# Patient Record
Sex: MALE | Race: WHITE | Employment: FULL TIME | URBAN - METROPOLITAN AREA
[De-identification: names, ages, dates, MRNs, and addresses within clinical notes are randomized per-mention and may not be internally consistent; named-entity substitution may affect disease eponyms.]

---
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
## 2020-01-01 ASSESSMENT — ANXIETY QUESTIONNAIRES: GAD7 TOTAL SCORE: 15

## 2020-01-03 ENCOUNTER — HOSPITAL ENCOUNTER (OUTPATIENT)
Dept: BEHAVIORAL HEALTH | Facility: CLINIC | Age: 20
End: 2020-01-03
Attending: PSYCHIATRY & NEUROLOGY
Payer: COMMERCIAL

## 2020-01-03 PROCEDURE — 90853 GROUP PSYCHOTHERAPY: CPT

## 2020-01-03 PROCEDURE — G0177 OPPS/PHP; TRAIN & EDUC SERV: HCPCS

## 2020-01-03 NOTE — GROUP NOTE
EBP Group Note    PATIENT'S NAME: Fantasma Desouza  MRN:   [id number]  :   2000  ACCT. NUMBER: [id number]  DATE OF SERVICE: 20  START TIME: 11:00 AM  END TIME: 11:50 AM  FACILITATOR: Sagrario Moncada  TOPIC:  EBP Group: Specialty Awareness  Adult Mental Health Day Treatment  TRACK: 1A    NUMBER OF PARTICIPANTS: 5    Summary of Group / Topics Discussed:  Specialty Topics: Life Transitions: The topic of life transitions was presented in order to help patients to better understand the challenges presented by life transitions, and how to best navigate them. Exploring the phases of transition and how one works through them was discussed. Patients were provided with information regarding community resources.     Patient Session Goals / Objectives:    Discussed the timing and nature of major life transitions    Explored how life transitions may impact mental health and functioning    Discussed coping strategies to manage symptoms and help with transitioning    Discussed and planned a successful transition        Patient Participation / Response:  Minimally participated, only when prompted / asked.    Demonstrated understanding of topics discussed through group discussion and participation    Treatment Plan:  Patient has a current master individualized treatment plan.  See Epic treatment plan for more information.    Sagrario Moncada

## 2020-01-03 NOTE — GROUP NOTE
RN Group Note    PATIENT'S NAME: Fantasma Desouza  MRN:   [id number]  :   2000  ACCT. NUMBER: [id number]  DATE OF SERVICE: 20  START TIME:  9:00 AM  END TIME:  9:50 AM  FACILITATOR: Sandra Hammond RN  TOPIC:  RN Group: [facility]  Adult Mental Health Day Treatment  TRACK: 1A    NUMBER OF PARTICIPANTS: 5    Summary of Group / Topics Discussed:  Foundations of Health: Sleep: Case study/sleep hygiene: Patients explored the connection between sleep and mental illness. Patients learned about how adequate sleep can improve health, productivity, wellness, quality of life, and safety.     Patient Session Goals / Objectives:  ? Demonstrated understanding of sleep hygiene practices and benefits of sleep  ? Identified sleep hygiene strategies to utilize     Described the connection between sleep disturbances and mental illness        Patient Participation / Response:  Minimally participated, only when prompted / asked.    Verbalized understanding of foundations of health topic    Treatment Plan:  Patient has an initial individualized treatment plan that was created as part of their diagnostic assessment / admission process.  A master individualized treatment plan is in the process of being developed with the patient and multi-disciplinary care team.    Sandra Hammond RN

## 2020-01-03 NOTE — GROUP NOTE
"Process Group Note    PATIENT'S NAME: Fantasma Desouza  MRN:   [id number]  :   2000  ACCT. NUMBER: [id number]  DATE OF SERVICE: 20  START TIME: 10:00 AM  END TIME: 10:50 AM  FACILITATOR: Sagrario Moncada  TOPIC:  Process Group    Diagnoses:  296.32 (F33.1) Major Depressive Disorder, Recurrent Episode, Moderate _300.00 (F41.9) Unspecified Anxiety Disorder       Adult Mental Health Day Treatment  TRACK: 1A    NUMBER OF PARTICIPANTS: 5          Data:    Session content: At the start of this group, patients were invited to check in by identifying themselves, describing their current emotional status, and identifying issues to address in this group.   Area(s) of treatment focus addressed in this session included Symptom Management, Personal Safety, Develop / Improve Independent Living Skills and Develop Socialization / Interpersonal Relationship Skills.  Fantasma reported he is \"okay.\"  He reported he is not sleeping due to nightmares.  He reported high symptoms and suicidal ideation present but no worse than before.  He committed to safety and denied intent to act.  He reported high ruminations and denied doing anything to cope.  GHe reported need to work out but low motivation was a barrier.  He denied other plans for the weekend.      Therapeutic Interventions/Treatment Strategies:  Psychotherapist offered support, feedback and validation and reinforced use of skills. Treatment modalities used include Motivational Interviewing, Cognitive Behavioral Therapy and Dialectical Behavioral Therapy.  Validated and normalized.  Attempted to highlighted and reinforced skills used but Client denied use of skills.  Provided skill suggestions to take steps towards working out.  Aided in creating a plan to help work towards goals.      Assessment:    Patient response:   Patient responded to session by accepting feedback and accepting support    Possible barriers to participation / learning include: and no barriers " identified    Health Issues:   Yes: Sleep disturbance, Associated Psychological Distress       Substance Use Review:   Substance Use: No active concerns identified.    Mental Status/Behavioral Observations  Appearance:   Appropriate   Eye Contact:   Poor  Psychomotor Behavior: Normal   Attitude:   Guarded   Orientation:   All  Speech   Rate / Production: Normal    Volume:  Soft   Mood:    Depressed   Affect:    Appropriate   Thought Content:   Rumination and Safety reports  presence of suicidal ideation passive suicidal ideation   Thought Form:  Coherent  Logical     Insight:    Fair     Plan:     Safety Plan: Committed to safety and agreed to follow previously developed safety coping plan.      Barriers to treatment: None identified    Patient Contracts (see media tab):  None    Substance Use: Not addressed in session     Continue or Discharge: Patient will continue in Adult Day Treatment (ADT)  as planned. Patient is likely to benefit from learning and using skills as they work toward the goals identified in their treatment plan.      Sagrario Moncada  January 3, 2020

## 2020-01-06 ENCOUNTER — HOSPITAL ENCOUNTER (OUTPATIENT)
Dept: BEHAVIORAL HEALTH | Facility: CLINIC | Age: 20
End: 2020-01-06
Attending: PSYCHIATRY & NEUROLOGY
Payer: COMMERCIAL

## 2020-01-06 PROCEDURE — 90853 GROUP PSYCHOTHERAPY: CPT

## 2020-01-06 PROCEDURE — G0177 OPPS/PHP; TRAIN & EDUC SERV: HCPCS | Performed by: OCCUPATIONAL THERAPIST

## 2020-01-06 NOTE — GROUP NOTE
Life Skills/OT Group Note    PATIENT'S NAME: Fantasma Desouza  MRN:   [id number]  :   2000  ACCT. NUMBER: [id number]  DATE OF SERVICE: 20  START TIME: 11:00 AM  END TIME: 11:50 AM  FACILITATOR: Mindi Siegel OTR/L  TOPIC:  Life Skills Group: Cognitive Functioning  Adult Mental Health Day Treatment  TRACK: 1A    NUMBER OF PARTICIPANTS: 4    Summary of Group / Topics Discussed:  Cognitive Functioning: Patients were taught and provided with an opportunity to gain awareness of how their mental health symptoms impact their current cognitive functioning as well as how this impacts their performance and participation in meaningful roles, relationships, and routines.  Patients were taught skills and strategies on how to monitor and improve cognitive performance.  Patients were given opportunities to practice taught skills and techniques in session and how to apply to everyday life.        Patient Session Goals / Objectives:    Identified how their mental health symptoms impact their functioning, focusing on specific cognitive challenges        Established a plan for practice of these skills in their own environments    Practiced and reflected on how to generalize taught skills to their everyday life          Patient Participation / Response:  Minimally participated, only when prompted / asked.    Patient presentation: constricted affect; quiet in group with minimal interaction with peers or this writer and Patient would benefit from additional opportunities to practice the content to be able to generalize it to their everyday life with increased intentionality, consistency, and efficacy in support of their psychiatric recovery    Treatment Plan:  Patient has an initial individualized treatment plan that was created as part of their diagnostic assessment / admission process.  A master individualized treatment plan is in the process of being developed with the patient and multi-disciplinary care team.    Mindi IGNACIO  Robbin, OTR/L

## 2020-01-06 NOTE — GROUP NOTE
Process Group Note    PATIENT'S NAME: Fantasma Desouza  MRN:   [id number]  :   2000  ACCT. NUMBER: [id number]  DATE OF SERVICE: 20  START TIME:  9:00 AM  END TIME:  9:50 AM  FACILITATOR: Sagrario Moncada  TOPIC:  Process Group    Diagnoses:  296.32 (F33.1) Major Depressive Disorder, Recurrent Episode, Moderate _300.00 (F41.9) Unspecified Anxiety Disorder       Adult Mental Health Day Treatment  TRACK: 1A    NUMBER OF PARTICIPANTS: 4          Data:    Session content: At the start of this group, patients were invited to check in by identifying themselves, describing their current emotional status, and identifying issues to address in this group.   Area(s) of treatment focus addressed in this session included Symptom Management, Personal Safety, Develop / Improve Independent Living Skills and Develop Socialization / Interpersonal Relationship Skills.  Fantasma reported having a solid past few days.  He reports feeling exhausted from lack of sleep finally lead to a good night of sleep.  He reported working out once with his friend but is disappointed that his friend initiated this not him.  He reported noticing an improved mood after working out.  He reported ongoing high ruminations.  He denied doing anything to cope but then stated he was trying to use distractions but they were not working.  He reported plans to try to work more this week.      Therapeutic Interventions/Treatment Strategies:  Psychotherapist offered support, feedback and validation and reinforced use of skills. Treatment modalities used include Motivational Interviewing, Cognitive Behavioral Therapy and Dialectical Behavioral Therapy.  Validated and normalized.  Highlighted and reinforced skills used; connected to positive outcomes.  Provided additional skill suggestions to aid in ruminations.  Identified and challenged cognitive distortions.  Problem solved barriers to initiating working out on his own.  Aided in creating a plan to help  work towards goals.      Assessment:    Patient response:   Patient responded to session by accepting feedback, giving feedback, listening, focusing on goals and being attentive    Possible barriers to participation / learning include: and no barriers identified    Health Issues:   Yes: Sleep disturbance, Associated Psychological Distress       Substance Use Review:   Substance Use: No active concerns identified.    Mental Status/Behavioral Observations  Appearance:   Appropriate   Eye Contact:   Fair   Psychomotor Behavior: Normal   Attitude:   Guarded   Orientation:   All  Speech   Rate / Production: Normal    Volume:  Normal   Mood:    Depressed   Affect:    Appropriate   Thought Content:   Safety reports  presence of suicidal ideation passive suicidal ideation  and thoughts of self-harm  Thought Form:  Coherent  Logical     Insight:    Fair     Plan:     Safety Plan: Committed to safety and agreed to follow previously developed safety coping plan.      Barriers to treatment: None identified    Patient Contracts (see media tab):  None    Substance Use: Not addressed in session     Continue or Discharge: Patient will continue in Adult Day Treatment (ADT)  as planned. Patient is likely to benefit from learning and using skills as they work toward the goals identified in their treatment plan.      Sagrario Moncada  January 6, 2020

## 2020-01-06 NOTE — GROUP NOTE
EBP Group Note    PATIENT'S NAME: Fantasma Desouza  MRN:   [id number]  :   2000  ACCT. NUMBER: [id number]  DATE OF SERVICE: 20  START TIME: 10:00 AM  END TIME: 10:50 AM  FACILITATOR: Sagrario Moncada  TOPIC:  EBP Group: Mindfulness  Adult Mental Health Day Treatment  TRACK: 1A    NUMBER OF PARTICIPANTS: 4    Summary of Group / Topics Discussed:  Mindfulness: What is Mindfulness: Patients received an overview on what mindfulness is and how mindfulness can benefit general health, mental health symptoms, and stressors. The history of mindfulness, its application to mental health therapies, and key concepts were also discussed. Patients discussed current awareness, knowledge, and practice of mindfulness skills. Patients also discussed barriers to mindfulness practice.     Patient Session Goals / Objectives:    Demonstrated understanding of key concepts and application to daily life    Identified when/how to use mindfulness     Resolved barriers to practice    Identified plan to use mindfulness in daily life      Patient Participation / Response:  Minimally participated, only when prompted / asked.    Demonstrated understanding of topics discussed through group discussion and participation    Treatment Plan:  Patient has an initial individualized treatment plan that was created as part of their diagnostic assessment / admission process.  A master individualized treatment plan is in the process of being developed with the patient and multi-disciplinary care team.    Sagrario Moncada

## 2020-01-08 ENCOUNTER — HOSPITAL ENCOUNTER (OUTPATIENT)
Dept: BEHAVIORAL HEALTH | Facility: CLINIC | Age: 20
End: 2020-01-08
Attending: PSYCHIATRY & NEUROLOGY
Payer: COMMERCIAL

## 2020-01-08 PROCEDURE — G0177 OPPS/PHP; TRAIN & EDUC SERV: HCPCS

## 2020-01-08 PROCEDURE — 90853 GROUP PSYCHOTHERAPY: CPT

## 2020-01-08 NOTE — GROUP NOTE
Life Skills/OT Group Note    PATIENT'S NAME: Fantasma Desouza  MRN:   [id number]  :   2000  ACCT. NUMBER: [id number]  DATE OF SERVICE: 20  START TIME: 11:00 AM  END TIME: 11:50 AM  FACILITATOR: Fabio Albright OT  TOPIC:  Life Skills Group: Sensory Approaches in Mental Health  Adult Mental Health Day Treatment  TRACK: 1A & 6A    NUMBER OF PARTICIPANTS: 6    Summary of Group / Topics Discussed:  Sensory Approaches in Mental Health:  Coping Through the Senses Introduction: Patients were introduced and taught about neurosensory based skills and strategies related to supporting effective self regulation skills.  Patients were taught about the eight sensory systems and how they can be used for coping with mental health symptoms and stressors.  Patients were provided with an experiential opportunity to increase self-awareness of helpful sensory input and self care activities. Patients were introduced on how to create supportive environments that encourage use of these skills.         Patient Session Goals / Objectives:    Identified specific and individualized neurosensory skills to help when distressed      Identified skills learned and how this applies to current daily life    Established a plan for practice of these skills in their own environments        Patient Participation / Response:  Minimally participated, only when prompted / asked.    Patient would benefit from additional opportunities to practice the content to be able to generalize it to their everyday life with increased intentionality, consistency, and efficacy in support of their psychiatric recovery    Treatment Plan:  Patient has an initial individualized treatment plan that was created as part of their diagnostic assessment / admission process.  A master individualized treatment plan is in the process of being developed with the patient and multi-disciplinary care team.    Fabio Albright OT

## 2020-01-08 NOTE — GROUP NOTE
"Process Group Note    PATIENT'S NAME: Fantasma Desouza  MRN:   [id number]  :   2000  ACCT. NUMBER: [id number]  DATE OF SERVICE: 20  START TIME:  9:00 AM  END TIME:  9:50 AM  FACILITATOR: Sagrario Moncada  TOPIC:  Process Group    Diagnoses:  296.32 (F33.1) Major Depressive Disorder, Recurrent Episode, Moderate _300.00 (F41.9) Unspecified Anxiety Disorder       Adult Mental Health Day Treatment  TRACK: 1A    NUMBER OF PARTICIPANTS: 5          Data:    Session content: At the start of this group, patients were invited to check in by identifying themselves, describing their current emotional status, and identifying issues to address in this group.   Area(s) of treatment focus addressed in this session included Symptom Management, Personal Safety, Develop / Improve Independent Living Skills and Develop Socialization / Interpersonal Relationship Skills.  Fantasma arrived 20 minutes late to group.  He reported feeling okay the last few days.  He signed up for a gym he feels was a \"once in a lifetime opperunity\" and something he has always wanted to do.  He reported stress related to financing this gym among other hobbies and interests.  He reports enjoying current job so he could return but worries about ruminations and being trigfgered as he thinks about relationship that recently ended.  He reported taking a new medication the alst few days that has helped improve sleep.  He is eating when able.  He reports he would benefit from increased structure in his time.      Therapeutic Interventions/Treatment Strategies:  Psychotherapist offered support, feedback and validation and reinforced use of skills. Treatment modalities used include Motivational Interviewing, Cognitive Behavioral Therapy and Dialectical Behavioral Therapy.  Validated and normalized.  Highlighted and reinforced skills used; connected to positive outcomes.  Provided additional skill suggestions.  Aided in creating a plan to help work towards " goals.      Assessment:    Patient response:   Patient responded to session by accepting feedback, listening, focusing on goals, being attentive and accepting support    Possible barriers to participation / learning include: and no barriers identified    Health Issues:   None reported       Substance Use Review:   Substance Use: No active concerns identified.    Mental Status/Behavioral Observations  Appearance:   Appropriate   Eye Contact:   Poor  Psychomotor Behavior: Normal   Attitude:   Cooperative   Orientation:   All  Speech   Rate / Production: Normal    Volume:  Normal   Mood:    Anxious  Normal  Affect:    Appropriate   Thought Content:   Clear  Thought Form:  Coherent  Logical     Insight:    Fair     Plan:     Safety Plan: No current safety concerns identified.  Recommended that patient call 911 or go to the local ED should there be a change in any of these risk factors.     Barriers to treatment: None identified    Patient Contracts (see media tab):  None    Substance Use: Not addressed in session     Continue or Discharge: Patient will continue in Adult Day Treatment (ADT)  as planned. Patient is likely to benefit from learning and using skills as they work toward the goals identified in their treatment plan.      Sagrario Moncada  January 8, 2020

## 2020-01-08 NOTE — GROUP NOTE
EBP Group Note    PATIENT'S NAME: Fantasma Desouza  MRN:   [id number]  :   2000  ACCT. NUMBER: [id number]  DATE OF SERVICE: 20  START TIME: 10:00 AM  END TIME: 10:50 AM  FACILITATOR: Sagrario Moncada  TOPIC:  EBP Group: Emotions Management  Adult Mental Health Day Treatment  TRACK: 1A    NUMBER OF PARTICIPANTS: 5    Summary of Group / Topics Discussed:  Emotions Management: Mood Tracking: Patients discussed and reviewed different resources to track one s mood, with a goal of identifying patterns and correlations between different factors and mood state.  Patients discussed ways to increase awareness of one s mood and how it may be impacted by environmental factors, diet, activity level, medication, etc. The group shared their experiences and thought processes for feedback.      Patient Session Goals / Objectives:    Increase awareness of daily mood patterns/changes    Report out identified factors that impact their mood    Demonstrate understanding of how to use different resources to track mood, and effectively use these to help manage symptoms      Patient Participation / Response:  Minimally participated, only when prompted / asked.    Demonstrated understanding of topics discussed through group discussion and participation    Treatment Plan:  Patient has a current master individualized treatment plan.  See Epic treatment plan for more information.    Sagrario Moncada

## 2020-01-17 ENCOUNTER — HOSPITAL ENCOUNTER (OUTPATIENT)
Dept: BEHAVIORAL HEALTH | Facility: CLINIC | Age: 20
End: 2020-01-17
Attending: PSYCHIATRY & NEUROLOGY
Payer: COMMERCIAL

## 2020-01-17 PROCEDURE — 90853 GROUP PSYCHOTHERAPY: CPT

## 2020-01-17 PROCEDURE — G0177 OPPS/PHP; TRAIN & EDUC SERV: HCPCS

## 2020-01-17 NOTE — GROUP NOTE
RN Group Note    PATIENT'S NAME: Fantasma Desouza  MRN:   [id number]  :   2000  ACCT. NUMBER: [id number]  DATE OF SERVICE: 20  START TIME:  9:00 AM  END TIME:  9:50 AM  FACILITATOR: Sandra Hammond RN  TOPIC:  RN Group: Mental Health Maintenance  Adult Mental Health Day Treatment  TRACK: 1B    NUMBER OF PARTICIPANTS: 7    Summary of Group / Topics Discussed:  Mental Health Maintenance:  Vulnerability: In this group, the concept of vulnerability was explored through the viewing, discussion, and self-reflection of the Olga Henry Talk Titled,  The Power of Vulnerability.      Patient Session Goals / Objectives:  ? Defined and described definition of vulnerability   ? Identified 2 or more ways of practicing authenticity         Patient Participation / Response:  Minimally participated, only when prompted / asked.    Identified / Expressed personal readiness to practice skills and Verbalized understanding of mental health maintenance topic    Treatment Plan:  Patient has a current master individualized treatment plan.  See Epic treatment plan for more information.    Sandra Hammond RN

## 2020-01-17 NOTE — GROUP NOTE
"Process Group Note    PATIENT'S NAME: Fantasma Desouza  MRN:   [id number]  :   2000  ACCT. NUMBER: [id number]  DATE OF SERVICE: 20  START TIME: 10:00 AM  END TIME: 10:50 AM  FACILITATOR: Sagrario Moncada  TOPIC:  Process Group    Diagnoses:  296.32 (F33.1) Major Depressive Disorder, Recurrent Episode, Moderate _300.00 (F41.9) Unspecified Anxiety Disorder       Adult Mental Health Day Treatment  TRACK: 1A    NUMBER OF PARTICIPANTS: 7          Data:    Session content: At the start of this group, patients were invited to check in by identifying themselves, describing their current emotional status, and identifying issues to address in this group.   Area(s) of treatment focus addressed in this session included Symptom Management, Personal Safety, Develop / Improve Independent Living Skills and Develop Socialization / Interpersonal Relationship Skills.  Fantasma reported being sick which is frustrating but otherwise mood is fine.  He reported his girlfriend messaged him about the [place] gifts he got her and he felt \"weirdly okay\" talking to her.  He reported awareness this event might lead to an increase in ruminations.  He reported feeling their is hope for their relationship which he does not want to rely on.  He reported going to the gym more and getting positive feedback has helped him mood.  He reported the added structure and routine in his life and also helped him.      Therapeutic Interventions/Treatment Strategies:  Psychotherapist offered support, feedback and validation and reinforced use of skills. Treatment modalities used include Motivational Interviewing, Cognitive Behavioral Therapy and Dialectical Behavioral Therapy.  Validated and normalized.  Highlighted and reinforced skills used; connected to positive outcomes.  Provided additional skill suggestions.  Aided in creating a plan to help work towards goals.        Assessment:    Patient response:   Patient responded to session by accepting " feedback, giving feedback, listening, focusing on goals, being attentive, accepting support and appearing disengaged    Possible barriers to participation / learning include: and no barriers identified    Health Issues:   Yes: illness, Associated Psychological Distress       Substance Use Review:   Substance Use: No active concerns identified.    Mental Status/Behavioral Observations  Appearance:   Appropriate   Eye Contact:   Fair   Psychomotor Behavior: Normal   Attitude:   Cooperative   Orientation:   All  Speech   Rate / Production: Normal    Volume:  Soft   Mood:    Normal  Affect:    Appropriate   Thought Content:   Clear  Thought Form:  Coherent  Logical     Insight:    Fair     Plan:     Safety Plan: No current safety concerns identified.  Recommended that patient call 911 or go to the local ED should there be a change in any of these risk factors.     Barriers to treatment: None identified    Patient Contracts (see media tab):  None    Substance Use: Not addressed in session     Continue or Discharge: Patient will continue in Adult Day Treatment (ADT)  as planned. Patient is likely to benefit from learning and using skills as they work toward the goals identified in their treatment plan.      Sagrario Moncada  January 17, 2020

## 2020-01-17 NOTE — GROUP NOTE
EBP Group Note    PATIENT'S NAME: Fantasma Desouza  MRN:   [id number]  :   2000  ACCT. NUMBER: [id number]  DATE OF SERVICE: 20  START TIME: 11:00 AM  END TIME: 11:50 AM  FACILITATOR: Sagrario Moncada  TOPIC:  EBP Group: Relationship Skills  Adult Mental Health Day Treatment  TRACK: 1A    NUMBER OF PARTICIPANTS: 7    Summary of Group / Topics Discussed:  Relationship Skills: Boundaries: Patients were provided with a general overview of interpersonal boundaries and how lack of boundaries relates to symptoms and functioning. The purpose is to help patients identify boundary issues and gain awareness and skills to work towards healthier interpersonal boundaries. Current awareness of healthy boundary characteristics and barriers to establishing healthy boundaries were discussed.    Patient Session Goals / Objectives:    Familiarized patients with the concept of interpersonal boundaries and their characteristics    Discussed and practiced strategies to promote healthier interpersonal boundaries    Identified boundary issues and identified plan to improve boundaries      Patient Participation / Response:  Moderately participated, sharing some personal reflections / insights and adequately adequately received / provided feedback with other participants.    Demonstrated understanding of topics discussed through group discussion and participation and Demonstrated understanding of relationship skills and communication skills    Treatment Plan:  Patient has a current master individualized treatment plan.  See Epic treatment plan for more information.    Sagrario Moncada

## 2020-01-20 ENCOUNTER — HOSPITAL ENCOUNTER (OUTPATIENT)
Dept: BEHAVIORAL HEALTH | Facility: CLINIC | Age: 20
End: 2020-01-20
Attending: PSYCHIATRY & NEUROLOGY
Payer: COMMERCIAL

## 2020-01-20 PROCEDURE — 90853 GROUP PSYCHOTHERAPY: CPT

## 2020-01-20 PROCEDURE — G0177 OPPS/PHP; TRAIN & EDUC SERV: HCPCS

## 2020-01-20 NOTE — GROUP NOTE
Life Skills/OT Group Note    PATIENT'S NAME: Fantasma Desouza  MRN:   [id number]  :   2000  ACCT. NUMBER: [id number]  DATE OF SERVICE: 20  START TIME: 11:00 AM  END TIME: 11:50 AM  FACILITATOR: Lawrence Maher OTR/L  TOPIC:  Life Skills Group: Lifestyle Balance and Structure  Adult Mental Health Day Treatment  TRACK: 1A    NUMBER OF PARTICIPANTS: 5    Summary of Group / Topics Discussed:  Lifestyle Balance and Strucure:  Benefits of Leisure on Mental Health: Patients explored and learned about the benefits and possibilities of leisure activity to create lifestyle balance that supports their mental and physical wellbeing.  Patients were assisted to identify individualized leisure values and interests, recognized the benefits of leisure activity on mental health, and problem solved barriers to leisure engagement and strategies to overcome.  Patient engaged in an experiential leisure activity to gain self-awareness and build milieu social aspects and reflected on the impact the experiential activity had on their mood.       Patient Session Goals / Objectives:    Increased awareness of the importance of engagement in leisure activities to support lifestyle balance and perceived quality of life    Identified strategies to recognize and challenge barriers to leisure participation     Facilitated exploration of meaningful leisure interests and values    Practiced and reflected on how to generalize taught skills to their everyday life        Patient Participation / Response:  Fully participated with the group by sharing personal reflections / insights and openly received / provided feedback with other participants.    Patient presentation: Calm,alert,focussed with stable mood.    Treatment Plan:  Patient has a current master individualized treatment plan.  See Epic treatment plan for more information.    MARY KATE Mccarthy/MATEUS

## 2020-01-20 NOTE — GROUP NOTE
EBP Group Note    PATIENT'S NAME: Fantasma Desouza  MRN:   [id number]  :   2000  ACCT. NUMBER: [id number]  DATE OF SERVICE: 20  START TIME: 10:00 AM  END TIME: 10:50 AM  FACILITATOR: Sagrario Moncada  TOPIC:  EBP Group: Self-Awareness  Adult Mental Health Day Treatment  TRACK: 1A    NUMBER OF PARTICIPANTS: 5    Summary of Group / Topics Discussed:  Self-Awareness: Values: Patients identified personal values by examining development of their current values and how their values influence their daily functioning and life choices. Patients explored the impact of their values on their thoughts, feelings, and actions. Patients discussed definition of personal values and how they develop and change over time. The goal is to help patients reconcile value conflicts and achieve balance and flexibility to improve mood and daily functioning.     Patient Session Goals / Objectives:    Examined development of values and impact of values on functioning    Identified and prioritized important values related to current well-being     Identified strategies to change or enhance values to positively impact symptoms    Assisted patients to find ways to adapt functioning to better fit their values        Patient Participation / Response:  Fully participated with the group by sharing personal reflections / insights and openly received / provided feedback with other participants.    Demonstrated understanding of topics discussed through group discussion and participation, Demonstrated understanding of values, strengths, and challenges to learn about themselves and Identified / Expressed readiness to act intentionally, increase self-compassion, promote personal growth    Treatment Plan:  Patient has a current master individualized treatment plan.  See Epic treatment plan for more information.    Sagrario Moncada

## 2020-01-20 NOTE — GROUP NOTE
Process Group Note    PATIENT'S NAME: Fantasma Desouza  MRN:   [id number]  :   2000  ACCT. NUMBER: [id number]  DATE OF SERVICE: 20  START TIME:  9:00 AM  END TIME:  9:50 AM  FACILITATOR: Sagrario Moncada  TOPIC:  Process Group    Diagnoses:  296.32 (F33.1) Major Depressive Disorder, Recurrent Episode, Moderate _300.00 (F41.9) Unspecified Anxiety Disorder         Adult Mental Health Day Treatment  TRACK: 1A    NUMBER OF PARTICIPANTS: 5          Data:    Session content: At the start of this group, patients were invited to check in by identifying themselves, describing their current emotional status, and identifying issues to address in this group.   Area(s) of treatment focus addressed in this session included Symptom Management, Personal Safety, Develop / Improve Independent Living Skills and Develop Socialization / Interpersonal Relationship Skills.  Fantasma reported his workout was not as successful as he had hoped so he ruminated about it all day.  He reported isolating as a result.  He noted positive feelings while watching MMA fight.  He reports mood is not bad or good but he has noticed and increase in ruminations about his situation with his girlfriend. He reported stress over not being able to play games with her little brother anymore.  He reported self judgments regarding his physical appearance.     Therapeutic Interventions/Treatment Strategies:  Psychotherapist offered support, feedback and validation and reinforced use of skills. Treatment modalities used include Motivational Interviewing, Cognitive Behavioral Therapy and Dialectical Behavioral Therapy.   Validated and normalized.  Highlighted and reinforced skills used; connected to positive outcomes.  Provided additional skill suggestions to aid in managing ruminations.  Identified and challenged cognitive distortions.  Aided in creating a plan to help work towards goals.        Assessment:    Patient response:   Patient responded to session  by accepting feedback, giving feedback, listening, focusing on goals, being attentive and accepting support    Possible barriers to participation / learning include: and no barriers identified    Health Issues:   None reported       Substance Use Review:   Substance Use: No active concerns identified.    Mental Status/Behavioral Observations  Appearance:   Appropriate   Eye Contact:   Fair   Psychomotor Behavior: Normal   Attitude:   Cooperative   Orientation:   All  Speech   Rate / Production: Normal    Volume:  Normal   Mood:    Anxious  Depressed  Irritable   Affect:    Appropriate   Thought Content:   Rumination  Thought Form:  Coherent  Logical     Insight:    Fair     Plan:     Safety Plan: No current safety concerns identified.  Recommended that patient call 911 or go to the local ED should there be a change in any of these risk factors.     Barriers to treatment: None identified    Patient Contracts (see media tab):  None    Substance Use: Not addressed in session     Continue or Discharge: Patient will continue in Adult Day Treatment (ADT)  as planned. Patient is likely to benefit from learning and using skills as they work toward the goals identified in their treatment plan.      Sagrario Moncada  January 20, 2020

## 2020-01-22 ENCOUNTER — HOSPITAL ENCOUNTER (OUTPATIENT)
Dept: BEHAVIORAL HEALTH | Facility: CLINIC | Age: 20
End: 2020-01-22
Attending: PSYCHIATRY & NEUROLOGY
Payer: COMMERCIAL

## 2020-01-22 PROCEDURE — 90853 GROUP PSYCHOTHERAPY: CPT | Performed by: COUNSELOR

## 2020-01-22 PROCEDURE — G0177 OPPS/PHP; TRAIN & EDUC SERV: HCPCS | Performed by: SOCIAL WORKER

## 2020-01-22 NOTE — GROUP NOTE
"Process Group Note    PATIENT'S NAME: Fantasma Desouza  MRN:   [id number]  :   2000  ACCT. NUMBER: [id number]  DATE OF SERVICE: 20  START TIME:  9:00 AM  END TIME:  9:50 AM  FACILITATOR: Ashleigh Ortiz LPCC  TOPIC:  Process Group    Diagnoses:  296.32 (F33.1) Major Depressive Disorder, Recurrent Episode, Moderate _300.00 (F41.9) Unspecified Anxiety Disorder       Adult Mental Health Day Treatment  TRACK: 1A    NUMBER OF PARTICIPANTS: 6          Data:    Session content: At the start of this group, patients were invited to check in by identifying themselves, describing their current emotional status, and identifying issues to address in this group.   Area(s) of treatment focus addressed in this session included Symptom Management, Safety, and Discharge Planning.   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 Client reported he would like to continue with the \"good feelings\" and challenge cognitive distortions regarding pressure he puts on himself to \"be where he used to be\" both physically and mentally. Client actively participated in providing support and accepting feedback within the group.     Therapeutic Interventions/Treatment Strategies:  Psychotherapist offered support, feedback and validation. Treatment modalities used include Motivational Interviewing and Cognitive Behavioral Therapy. Interventions include Cognitive Restructuring:  Explored impact of ineffective thoughts / distortions on mood and activity and Assisted patient in formulating new neutral/positive alternatives to challenge less helpful / ineffective thoughts and Mindfulness: Facilitated discussion of " when/how to use mindfulness skills to benefit general health, mental health symptoms, and stressors.    Assessment:    Patient response:   Patient responded to session by accepting feedback, giving feedback and listening    Possible barriers to participation / learning include: and no barriers identified    Health Issues:   None reported       Substance Use Review:   Substance Use: No active concerns identified.    Mental Status/Behavioral Observations  Appearance:   Appropriate   Eye Contact:   Fair   Psychomotor Behavior: Normal   Attitude:   Cooperative   Orientation:   All  Speech   Rate / Production: Normal    Volume:  Normal   Mood:    Irritable   Affect:    Appropriate   Thought Content:   Clear  Thought Form:  Coherent  Logical     Insight:    Fair     Plan:     Safety Plan: No current safety concerns identified.  Recommended that patient call 911 or go to the local ED should there be a change in any of these risk factors.     Barriers to treatment: None identified    Patient Contracts (see media tab):  None    Substance Use: Not addressed in session     Continue or Discharge: Patient will continue in Adult Day Treatment (ADT)  as planned. Patient is likely to benefit from learning and using skills as they work toward the goals identified in their treatment plan.      Ashleigh Ortiz, CONSTANZAC  January 22, 2020

## 2020-01-22 NOTE — GROUP NOTE
Life Skills/OT Group Note    PATIENT'S NAME: Fantasma Desouza  MRN:   [id number]  :   2000  ACCT. NUMBER: [id number]  DATE OF SERVICE: 20  START TIME: 11:00 AM  END TIME: 11:50 AM  FACILITATOR: Savanna Mckeon LICSW  TOPIC:  Life Skills Group: Communication and Social Skills Development  Adult Mental Health Day Treatment  TRACK: 1A    NUMBER OF PARTICIPANTS: 6    Summary of Group / Topics Discussed:  Communication and Social Skills Development: Communication Styles: Communication Effectiveness: Patients were taught and gained awareness of effective communication skills in the following areas: Trust building, verbal communication, listening, and non verbal communication.  Patients identified both personal strengths and opportunities for growth in these areas to improve overall communication and connection with other people.     Patient Session Goals / Objectives:    Identified strengths and opportunities for growth in communication skills and how these  impact their ability to communicate clearly with other people       Improved awareness of important aspects of communication skills and how this relates to mental health recovery        Established a plan for practice of these skills in their own environments    Practiced and reflected on how to generalize taught skills to their everyday life        Patient Participation / Response:  Fully participated with the group by sharing personal reflections / insights and openly received / provided feedback with other participants.    Verbalized understanding of content    Treatment Plan:  Patient has a current master individualized treatment plan.  See Epic treatment plan for more information.    PIYUSH Barboza

## 2020-01-22 NOTE — GROUP NOTE
EBP Group Note    PATIENT'S NAME: Fantasma Desouza  MRN:   [id number]  :   2000  [id number]  DATE OF SERVICE: 20  START TIME: 10:00 AM  END TIME: 10:50 AM  FACILITATOR: Ashleigh Ortiz LPCC  TOPIC:  EBP Group: Self-Awareness  Adult Mental Health Day Treatment  TRACK: 1A    NUMBER OF PARTICIPANTS: 6    Summary of Group / Topics Discussed:  Self-Awareness: Personal Strengths: Topic focused on assisting patients in identifying personal strengths and how they relate to the management of mental health symptoms. Patients discussed the benefits of acknowledging their personal strengths and their impact on mood improvement, mindfulness, and perspective. Patients worked to increase time spent on recognition and appreciation of what is positive and working in their lives. The goal is to reduce rumination and negative thinking resulting in increased mindfulness and resilience. Patients will work to put skills into practice and problem-solve barriers.     Patient Session Goals / Objectives:    Identified personal strengths    Identified barriers to recognition of personal strengths    Verbalized understanding of strategies to increase use of their strengths in management of daily symptoms      Patient Participation / Response:  Moderately participated, sharing some personal reflections / insights and adequately adequately received / provided feedback with other participants.    Demonstrated understanding of topics discussed through group discussion and participation, Demonstrated understanding of values, strengths, and challenges to learn about themselves and Verbalized understanding of ways to proactively manage illness    Treatment Plan:  Patient has a current master individualized treatment plan.  See Epic treatment plan for more information.    JOHN Sommers

## 2020-01-24 ENCOUNTER — HOSPITAL ENCOUNTER (OUTPATIENT)
Dept: BEHAVIORAL HEALTH | Facility: CLINIC | Age: 20
End: 2020-01-24
Attending: PSYCHIATRY & NEUROLOGY
Payer: COMMERCIAL

## 2020-01-24 PROCEDURE — 90853 GROUP PSYCHOTHERAPY: CPT

## 2020-01-24 PROCEDURE — G0177 OPPS/PHP; TRAIN & EDUC SERV: HCPCS

## 2020-01-24 NOTE — GROUP NOTE
Process Group Note    PATIENT'S NAME: Fantasma Desouza  MRN:   [id number]  :   2000  ACCT. NUMBER: [id number]  DATE OF SERVICE: 20  START TIME: 10:00 AM  END TIME: 10:50 AM  FACILITATOR: Sagrario Moncada  TOPIC:  Process Group    Diagnoses:  296.32 (F33.1) Major Depressive Disorder, Recurrent Episode, Moderate _300.00 (F41.9) Unspecified Anxiety Disorder       Adult Mental Health Day Treatment  TRACK: 1A    NUMBER OF PARTICIPANTS: 5          Data:    Session content: At the start of this group, patients were invited to check in by identifying themselves, describing their current emotional status, and identifying issues to address in this group.   Area(s) of treatment focus addressed in this session included Symptom Management, Personal Safety, Develop / Improve Independent Living Skills and Develop Socialization / Interpersonal Relationship Skills.  Fantasma reported having a good workout where he was finally able to challenge negative thoughts. He reports focusing on the idea that even though he is not where he was in the past he is still doing the workouts that he needs to do to grow.  He was able to apply this to his life.  He reported mood has improved and ruminations have decreased.  He reports being able to think of girlfriend without as much pain or rumination.  He reported trying to focus on managing boredom and living in small moments.      Therapeutic Interventions/Treatment Strategies:  Psychotherapist offered support, feedback and validation and reinforced use of skills. Treatment modalities used include Motivational Interviewing, Cognitive Behavioral Therapy and Dialectical Behavioral Therapy.  Validated and normalized.  Highlighted and reinforced skills used; connected to positive outcomes.  Provided additional skill suggestions.  Aided in creating a plan to help work towards goals.      Assessment:    Patient response:   Patient responded to session by accepting feedback, giving feedback,  listening, focusing on goals, being attentive and accepting support    Possible barriers to participation / learning include: and no barriers identified    Health Issues:   None reported       Substance Use Review:   Substance Use: No active concerns identified.    Mental Status/Behavioral Observations  Appearance:   Appropriate   Eye Contact:   Good   Psychomotor Behavior: Normal   Attitude:   Cooperative   Orientation:   All  Speech   Rate / Production: Normal    Volume:  Normal   Mood:    Normal  Affect:    Appropriate   Thought Content:   Clear  Thought Form:  Coherent  Logical     Insight:    Good     Plan:     Safety Plan: No current safety concerns identified.  Recommended that patient call 911 or go to the local ED should there be a change in any of these risk factors.     Barriers to treatment: None identified    Patient Contracts (see media tab):  None    Substance Use: Not addressed in session     Continue or Discharge: Patient will continue in Adult Day Treatment (ADT)  as planned. Patient is likely to benefit from learning and using skills as they work toward the goals identified in their treatment plan.      Sagrario Moncada  January 24, 2020

## 2020-01-24 NOTE — GROUP NOTE
RN Group Note    PATIENT'S NAME: Fantasma Desouza  MRN:   [id number]  :   2000  ACCT. NUMBER: [id number]  DATE OF SERVICE: 20  START TIME:  9:00 AM  END TIME:  9:50 AM  FACILITATOR: Sandra Hammond RN  TOPIC:  RN Group: Mental Health Maintenance  Adult Mental Health Day Treatment  TRACK: 1A    NUMBER OF PARTICIPANTS: 5    Summary of Group / Topics Discussed:  Mental Health Maintenance:  Stigma: In this group patients explored stigma surrounding a mental health diagnosis.  The group discussed the way stigma impacts their own life, and discussed strategies to reduce. The relationship between physical and mental health were also explored in the context of healthcare access, treatment, and support.    Patient Session Goals / Objectives:  ? Patients identified the importance of practicing emotional hygiene  ? Patients identified ways to decrease the  impact of stigma in their own life          Patient Participation / Response:  Fully participated with the group by sharing personal reflections / insights and openly received / provided feedback with other participants.    Demonstrated understanding of topics discussed through group discussion and participation and Identified / Expressed personal readiness to practice skills    Treatment Plan:  Patient has a current master individualized treatment plan.  See Epic treatment plan for more information.    Sandra Hammond RN

## 2020-01-24 NOTE — GROUP NOTE
EBP Group Note    PATIENT'S NAME: Fantasma Desouza  MRN:   [id number]  :   2000  ACCT. NUMBER: [id number]  DATE OF SERVICE: 20  START TIME: 11:00 AM  END TIME: 11:50 AM  FACILITATOR: Sagrario Moncada  TOPIC:  EBP Group: Self-Awareness  Adult Mental Health Day Treatment  TRACK: 1A    NUMBER OF PARTICIPANTS: 5    Summary of Group / Topics Discussed:  Self-Awareness: Self-Compassion: Patients received overview of key concepts in developing self-compassion. Patients discussed mindfulness, self-kindness, and finding common humanity. Patients identified their current approach to problems in their lives and learned skills for increasing self-compassion. Patients identified ways they can put self-compassion skills into practice and problem solve barriers to application of skills.     Patient Session Goals / Objectives:    [place] components of self-compassion    Identify ways to practice self-compassion in daily life    Problem solve barriers to self-compassion practice      Patient Participation / Response:  Moderately participated, sharing some personal reflections / insights and adequately adequately received / provided feedback with other participants.    Demonstrated understanding of topics discussed through group discussion and participation, Demonstrated understanding of values, strengths, and challenges to learn about themselves and Identified / Expressed readiness to act intentionally, increase self-compassion, promote personal growth    Treatment Plan:  Patient has a current master individualized treatment plan.  See Epic treatment plan for more information.    Sagrario Moncada

## 2020-01-29 ENCOUNTER — HOSPITAL ENCOUNTER (OUTPATIENT)
Dept: BEHAVIORAL HEALTH | Facility: CLINIC | Age: 20
End: 2020-01-29
Attending: PSYCHIATRY & NEUROLOGY
Payer: COMMERCIAL

## 2020-01-29 PROCEDURE — G0177 OPPS/PHP; TRAIN & EDUC SERV: HCPCS

## 2020-01-29 PROCEDURE — 90853 GROUP PSYCHOTHERAPY: CPT

## 2020-01-29 NOTE — GROUP NOTE
Life Skills/OT Group Note    PATIENT'S NAME: Fantasma Desouza  MRN:   [id number]  :   2000  ACCT. NUMBER: [id number]  DATE OF SERVICE: 20  START TIME: 11:00 AM  END TIME: 11:50 AM  FACILITATOR: Fabio Albright OT  TOPIC:  Life Skills Group: Sensory Approaches in Mental Health  Adult Mental Health Day Treatment  TRACK: 1A    NUMBER OF PARTICIPANTS: 5    Summary of Group / Topics Discussed:  Sensory Approaches in Mental Health:  Sensory Enhanced Mindfulness: Patients were taught and provided with an opportunity to explore and practice how using sensory enhanced mindfulness practices can help them stay grounded in the present moment as a way to manage mental health symptoms and stressors.         Patient Session Goals / Objectives:    Identified how using sensory enhanced mindfulness practices can be used for grounding, stress management, and self regulation      Improved awareness of different types of sensory enhanced mindfulness activities that assist with healthy coping of stress and symptoms      Established a plan for practice of these skills in their own environments    Practiced and reflected on how to generalize taught skills to their everyday life        Patient Participation / Response:  Fully participated with the group by sharing personal reflections / insights and openly received / provided feedback with other participants.    Patient presentation: Good participation and sharing. Reports the mindful movement practice supports his interest in body based therapies in general. Shares with the group how he can apply taught content to his everyday life. , Verbalized understanding of content and Patient would benefit from additional opportunities to practice the content to be able to generalize it to their everyday life with increased intentionality, consistency, and efficacy in support of their psychiatric recovery    Treatment Plan:  Patient has a current master individualized treatment plan.  See  Epic treatment plan for more information.    Fabio Albright, OT

## 2020-01-29 NOTE — GROUP NOTE
EBP Group Note    PATIENT'S NAME: Fantasma Desouza  MRN:   [id number]  :   2000  ACCT. NUMBER: [id number]  DATE OF SERVICE: 20  START TIME: 10:00 AM  END TIME: 10:50 AM  FACILITATOR: Sagrario Moncada  TOPIC:  EBP Group: Emotions Management  Adult Mental Health Day Treatment  TRACK: 1A    NUMBER OF PARTICIPANTS: 5    Summary of Group / Topics Discussed:  Emotions Management: Anger: Patients explored and shared personal experiences associated with feelings of anger.  Group explored how these feelings develop, what they mean to each individual, and how to increase acceptance and usefulness of these feelings.  Discussed anger as a  secondary  emotion and reviewed ways to manage anger and challenge associated cognitive distortions. Group members worked to contextualize these concepts and promote healing.     Patient Session Goals / Objectives:    Discuss and review definitions and personal views/experiences with anger    Explore how feelings of anger impact functioning    Understand and practice strategies to manage difficult emotions and move towards healing    Demonstrate understanding of the feelings of anger    Verbalize how these emotions have impacted their lives/functioning    Verbalize of knowledge gained and possible interventions to manage feelings      Patient Participation / Response:  Moderately participated, sharing some personal reflections / insights and adequately adequately received / provided feedback with other participants.    Demonstrated understanding of topics discussed through group discussion and participation and Expressed understanding of the relevance / importance of emotions management skills at distressing times in life    Treatment Plan:  Patient has a current master individualized treatment plan.  See Epic treatment plan for more information.    Sagrario Moncada

## 2020-01-29 NOTE — GROUP NOTE
Process Group Note    PATIENT'S NAME: Fantasma Desouza  MRN:   [id number]  :   2000  ACCT. NUMBER: [id number]  DATE OF SERVICE: 20  START TIME:  9:00 AM  END TIME:  9:50 AM  FACILITATOR: Sagrario Moncada  TOPIC:  Process Group    Diagnoses:  296.32 (F33.1) Major Depressive Disorder, Recurrent Episode, Moderate _300.00 (F41.9) Unspecified Anxiety Disorder       Adult Mental Health Day Treatment  TRACK: 1A    NUMBER OF PARTICIPANTS: 5          Data:    Session content: At the start of this group, patients were invited to check in by identifying themselves, describing their current emotional status, and identifying issues to address in this group.   Area(s) of treatment focus addressed in this session included Symptom Management, Personal Safety, Develop / Improve Independent Living Skills and Develop Socialization / Interpersonal Relationship Skills.  Fantasma reported mood is poor.  He reported feeling he did so much good work on symptoms but that it has been pointless and he is back to [facility] one.  He denied catalyst but later reported tattoo triggered thoughts of girlfriend he is on a break with and he saw her posts on social media which were distressing.  He reported elevated ruminations and feeling hopeless.  He did not endorse safety concerns.      Therapeutic Interventions/Treatment Strategies:  Psychotherapist offered support, feedback and validation and reinforced use of skills. Treatment modalities used include Motivational Interviewing, Cognitive Behavioral Therapy and Dialectical Behavioral Therapy. Validated and normalized.  Highlighted and reinforced skills used; connected to positive outcomes.  Provided additional skill suggestions.  Identified and challenged cognitive distortions.  Encouraged him to limit ability to see her social media.  Taught on importance of balancing distractions and processing.  Reflected on progress. Aided in creating a plan to help work towards goals.       Assessment:    Patient response:   Patient responded to session by accepting feedback, giving feedback, listening, focusing on goals, being attentive and accepting support    Possible barriers to participation / learning include: and no barriers identified    Health Issues:   None reported       Substance Use Review:   Substance Use: No active concerns identified.    Mental Status/Behavioral Observations  Appearance:   Appropriate   Eye Contact:   Poor  Psychomotor Behavior: Normal   Attitude:   Cooperative   Orientation:   All  Speech   Rate / Production: Normal    Volume:  Normal   Mood:    Depressed   Affect:    Appropriate   Thought Content:   Rumination  Thought Form:  Coherent  Logical     Insight:    Fair     Plan:     Safety Plan: No current safety concerns identified.  Recommended that patient call 911 or go to the local ED should there be a change in any of these risk factors.     Barriers to treatment: None identified    Patient Contracts (see media tab):  None    Substance Use: Not addressed in session     Continue or Discharge: Patient will continue in Adult Day Treatment (ADT)  as planned. Patient is likely to benefit from learning and using skills as they work toward the goals identified in their treatment plan.      Sagrario Moncada  January 29, 2020

## 2020-02-03 ENCOUNTER — HOSPITAL ENCOUNTER (OUTPATIENT)
Dept: BEHAVIORAL HEALTH | Facility: CLINIC | Age: 20
End: 2020-02-03
Attending: PSYCHIATRY & NEUROLOGY
Payer: COMMERCIAL

## 2020-02-03 PROCEDURE — 90853 GROUP PSYCHOTHERAPY: CPT

## 2020-02-03 PROCEDURE — G0177 OPPS/PHP; TRAIN & EDUC SERV: HCPCS

## 2020-02-03 NOTE — ADDENDUM NOTE
Encounter addended by: Lawrence Maher, OTR/L on: 2/3/2020 12:53 PM   Actions taken: Clinical Note Signed, Charge Capture section accepted

## 2020-02-03 NOTE — GROUP NOTE
Life Skills/OT Group Note    PATIENT'S NAME: Fantasma Desouza  MRN:   [id number]  :   2000  ACCT. NUMBER: [id number]  DATE OF SERVICE: 20  START TIME: 11:00 AM  END TIME: 11:50 AM  FACILITATOR: Lawrence Maher OTR/L  TOPIC:  Life Skills Group: Resiliency Development  Adult Mental Health Day Treatment  TRACK: 1A    NUMBER OF PARTICIPANTS: 8    Summary of Group / Topics Discussed:  Resiliency Development:  : Patients were taught how to identify stressors, signs of stress, coping skills, and prevention strategies for overall stress management.  Patients were given the opportunity to identify both ongoing and acute mental health symptoms and how to effectively manage these symptoms by developing an effective aftercare plan.  Patients increased awareness of community based resources.    Patient Session Goals / Objectives:    Identified how using coping skills can be used for changement       Improved awareness of individualed symptoms and stressors and how to effectively cope     Established a relapse prevention plan to practice these skills in their own environments    Practiced and reflected on how to generalize taught skills to their everyday life          Patient Participation / Response:  Fully participated with the group by sharing personal reflections / insights and openly received / provided feedback with other participants.    Patient presentation: Calm,alert,focused with stable mood and thought process.    Treatment Plan:  Patient has a current master individualized treatment plan.  See Epic treatment plan for more information.    MARY KATE Mccarthy/MATEUS

## 2020-02-03 NOTE — GROUP NOTE
Process Group Note    PATIENT'S NAME: Fantasma Desouza  MRN:   [id number]  :   2000  ACCT. NUMBER: [id number]  DATE OF SERVICE: 20  START TIME:  9:00 AM  END TIME:  9:50 AM  FACILITATOR: Sagrario Moncada  TOPIC:  Process Group    Diagnoses:  296.32 (F33.1) Major Depressive Disorder, Recurrent Episode, Moderate _300.00 (F41.9) Unspecified Anxiety Disorder       Adult Mental Health Day Treatment  TRACK: 1a    NUMBER OF PARTICIPANTS: 8          Data:    Session content: At the start of this group, patients were invited to check in by identifying themselves, describing their current emotional status, and identifying issues to address in this group.   Area(s) of treatment focus addressed in this session included Symptom Management, Personal Safety, Develop / Improve Independent Living Skills and Develop Socialization / Interpersonal Relationship Skills.  Fantasma reported missing group due to going snowboarding.  He reported positive experience snowboarding but otherwise continuing low mood.  He reported he and his girlfriend have been talking a lot more which makes him feel hopeful that they can reconnect but worried that this is too good to be true.  He reported continuing to work out which has helped him manage depression in the past but is not noticing it as helpful now.      Therapeutic Interventions/Treatment Strategies:  Psychotherapist offered support, feedback and validation and reinforced use of skills. Treatment modalities used include Motivational Interviewing, Cognitive Behavioral Therapy and Dialectical Behavioral Therapy.  Validated and normalized.  Highlighted and reinforced skills used; connected to positive outcomes.  Provided additional skill suggestions.  Encouraged use of interpersonal skills to assert needs and engage in open communication.  Aided in creating a plan to help work towards goals.        Assessment:    Patient response:   Patient responded to session by accepting feedback,  giving feedback, listening, focusing on goals, being attentive and accepting support    Possible barriers to participation / learning include: poor attendance    Health Issues:   None reported       Substance Use Review:   Substance Use: No active concerns identified.    Mental Status/Behavioral Observations  Appearance:   Appropriate   Eye Contact:   Fair   Psychomotor Behavior: Normal   Attitude:   Cooperative   Orientation:   All  Speech   Rate / Production: Normal    Volume:  Normal   Mood:    Anxious  Depressed   Affect:    Appropriate   Thought Content:   Rumination  Thought Form:  Coherent  Logical     Insight:    Fair     Plan:     Safety Plan: No current safety concerns identified.  Recommended that patient call 911 or go to the local ED should there be a change in any of these risk factors.     Barriers to treatment: None identified    Patient Contracts (see media tab):  None    Substance Use: Not addressed in session     Continue or Discharge: Patient will continue in Adult Day Treatment (ADT)  as planned. Patient is likely to benefit from learning and using skills as they work toward the goals identified in their treatment plan.      Sagrario Moncada  February 3, 2020

## 2020-02-03 NOTE — GROUP NOTE
EBP Group Note    PATIENT'S NAME: Fantasma Desouza  MRN:   [id number]  :   2000  ACCT. NUMBER: [id number]  DATE OF SERVICE: 20  START TIME: 10:00 AM  END TIME: 10:50 AM  FACILITATOR: Sagrario Moncada  TOPIC:  EBP Group: Cognitive Restructuring  Adult Mental Health Day Treatment  TRACK: 1a    NUMBER OF PARTICIPANTS: 8    Summary of Group / Topics Discussed:  Cognitive Restructuring: Distortions: Patients received an overview of how to identify common cognitive distortions. Patients will explore alternatives to cognitive distortions and practice challenging their negative thought patterns. The goal is to help patients target modify ineffective thought patterns.     Patient Session Goals / Objectives:    Familiarized self with ineffective / unhealthy thoughts and how they develop.      Explored impact of ineffective thoughts / distortions on mood and activity    Formulated new neutral/positive alternatives to challenge less helpful / ineffective thoughts.    Practiced and plan to apply in daily life               Patient Participation / Response:  Fully participated with the group by sharing personal reflections / insights and openly received / provided feedback with other participants.    Demonstrated understanding of topics discussed through group discussion and participation, Expressed understanding of the relationship between behaviors, thoughts, and feelings and Demonstrated knowledge of personal thought patterns and how they impact their mood and behavior.    Treatment Plan:  Patient has a current master individualized treatment plan.  See Epic treatment plan for more information.    Sagrario Moncada

## 2020-02-05 ENCOUNTER — HOSPITAL ENCOUNTER (OUTPATIENT)
Dept: BEHAVIORAL HEALTH | Facility: CLINIC | Age: 20
End: 2020-02-05
Attending: PSYCHIATRY & NEUROLOGY
Payer: COMMERCIAL

## 2020-02-05 PROCEDURE — 90853 GROUP PSYCHOTHERAPY: CPT

## 2020-02-05 PROCEDURE — G0177 OPPS/PHP; TRAIN & EDUC SERV: HCPCS

## 2020-02-05 NOTE — GROUP NOTE
EBP Group Note    PATIENT'S NAME: Fantasma Desouza  MRN:   [id number]  :   2000  ACCT. NUMBER: [id number]  DATE OF SERVICE: 20  START TIME: 10:00 AM  END TIME: 10:50 AM  FACILITATOR: Sagrario Moncada  TOPIC:  EBP Group: Cognitive Restructuring  Adult Mental Health Day Treatment  TRACK: 1A    NUMBER OF PARTICIPANTS: 9  Sheila Ortiz sat in as second staff    Summary of Group / Topics Discussed:  Cognitive Restructuring: Distortions: Patients received an overview of how to identify common cognitive distortions. Patients will explore alternatives to cognitive distortions and practice challenging their negative thought patterns. The goal is to help patients target modify ineffective thought patterns.     Patient Session Goals / Objectives:    Familiarized self with ineffective / unhealthy thoughts and how they develop.      Explored impact of ineffective thoughts / distortions on mood and activity    Formulated new neutral/positive alternatives to challenge less helpful / ineffective thoughts.    Practiced and plan to apply in daily life               Patient Participation / Response:  Fully participated with the group by sharing personal reflections / insights and openly received / provided feedback with other participants.    Demonstrated understanding of topics discussed through group discussion and participation, Expressed understanding of the relationship between behaviors, thoughts, and feelings and Demonstrated knowledge of personal thought patterns and how they impact their mood and behavior.    Treatment Plan:  Patient has a current master individualized treatment plan.  See Epic treatment plan for more information.    Sagrario Moncada

## 2020-02-05 NOTE — GROUP NOTE
Process Group Note    PATIENT'S NAME: Fantasma Desouza  MRN:   [id number]  :   2000  ACCT. NUMBER: [id number]  DATE OF SERVICE: 20  START TIME:  9:00 AM  END TIME:  9:50 AM  FACILITATOR: Sagrario Moncada  TOPIC:  Process Group    Diagnoses:  296.32 (F33.1) Major Depressive Disorder, Recurrent Episode, Moderate _300.00 (F41.9) Unspecified Anxiety Disorder       Adult Mental Health Day Treatment  TRACK: 1A    NUMBER OF PARTICIPANTS: 9  Katie Sevilla sat in as second staff          Data:    Session content: At the start of this group, patients were invited to check in by identifying themselves, describing their current emotional status, and identifying issues to address in this group.   Area(s) of treatment focus addressed in this session included Symptom Management, Personal Safety, Develop / Improve Independent Living Skills and Develop Socialization / Interpersonal Relationship Skills.  Fantasma reported mood has been up and down.  He reported almost a manic high after MMA workouts.  He reported this helped him to break out of depression rut.  He reported low mood when talking with his estranged girlfriend and hearing her say that she still cares about him.  He reports conflicting feelings as he wants her to care about him but it makes it harder if they do officially break up.      Therapeutic Interventions/Treatment Strategies:  Psychotherapist offered support, feedback and validation and reinforced use of skills. Treatment modalities used include Motivational Interviewing, Cognitive Behavioral Therapy and Dialectical Behavioral Therapy.  Validated and normalized.  Highlighted and reinforced skills used; connected to positive outcomes.  Provided additional skill suggestions.  Aided in creating a plan to help work towards goals.      Assessment:    Patient response:   Patient responded to session by accepting feedback, giving feedback, listening, focusing on goals, being attentive and accepting  support    Possible barriers to participation / learning include: and no barriers identified    Health Issues:   None reported       Substance Use Review:   Substance Use: No active concerns identified.    Mental Status/Behavioral Observations  Appearance:   Appropriate   Eye Contact:   Poor  Psychomotor Behavior: Normal   Attitude:   Cooperative   Orientation:   All  Speech   Rate / Production: Normal    Volume:  Normal   Mood:    Depressed  Elevated   Affect:    Appropriate   Thought Content:   Clear  Thought Form:  Coherent  Logical     Insight:    Fair     Plan:     Safety Plan: No current safety concerns identified.  Recommended that patient call 911 or go to the local ED should there be a change in any of these risk factors.     Barriers to treatment: None identified    Patient Contracts (see media tab):  None    Substance Use: Not addressed in session     Continue or Discharge: Patient will continue in Adult Day Treatment (ADT)  as planned. Patient is likely to benefit from learning and using skills as they work toward the goals identified in their treatment plan.      Sagrario Moncada  February 5, 2020

## 2020-02-05 NOTE — GROUP NOTE
Life Skills/OT Group Note    PATIENT'S NAME: Fantasma Desouza  MRN:   [id number]  :   2000  ACCT. NUMBER: [id number]  DATE OF SERVICE: 20  START TIME: 11:00 AM  END TIME: 11:50 AM  FACILITATOR: Fabio Albright OT  TOPIC:  Life Skills Group: Sensory Approaches in Mental Health  Adult Mental Health Day Treatment  TRACK: 1A    NUMBER OF PARTICIPANTS: 9, 2 staff.    Summary of Group / Topics Discussed:  Sensory Approaches in Mental Health:  Sensory Enhanced Mindfulness: Sensorimotor Group attentional regulation. Patients were taught and provided with an opportunity to explore and practice how using sensory enhanced mindfulness practices can help them stay grounded in the present moment as a way to manage mental health symptoms and stressors.     Patient Session Goals / Objectives:    Identified how using sensory enhanced mindfulness practices can be used for grounding, stress management, and self regulation      Improved awareness of different types of sensory enhanced mindfulness activities that assist with healthy coping of stress and symptoms      Established a plan for practice of these skills in their own environments    Practiced and reflected on how to generalize taught skills to their everyday life        Patient Participation / Response:  Fully participated with the group by sharing personal reflections / insights and openly received / provided feedback with other participants.    Patient presentation: Good effort and takes on group leadership role. Found the process was mindful and kept him in the present moment. , Verbalized understanding of content and Patient would benefit from additional opportunities to practice the content to be able to generalize it to their everyday life with increased intentionality, consistency, and efficacy in support of their psychiatric recovery    Treatment Plan:  Patient has a current master individualized treatment plan.  See Epic treatment plan for more  information.    Fabio Albright, OT

## 2020-02-07 ENCOUNTER — HOSPITAL ENCOUNTER (OUTPATIENT)
Dept: BEHAVIORAL HEALTH | Facility: CLINIC | Age: 20
End: 2020-02-07
Attending: PSYCHIATRY & NEUROLOGY
Payer: COMMERCIAL

## 2020-02-07 PROCEDURE — 90853 GROUP PSYCHOTHERAPY: CPT

## 2020-02-07 PROCEDURE — G0177 OPPS/PHP; TRAIN & EDUC SERV: HCPCS

## 2020-02-07 NOTE — GROUP NOTE
EBP Group Note    PATIENT'S NAME: Fantasma Desouza  MRN:   [id number]  :   2000  [id number]  DATE OF SERVICE: 20  START TIME: 11:00 AM  END TIME: 11:50 AM  FACILITATOR: Sagrario Moncada  TOPIC:  EBP Group: Cognitive Restructuring  Adult Mental Health Day Treatment  TRACK: 1A    NUMBER OF PARTICIPANTS: 9    Summary of Group / Topics Discussed:  Cognitive Restructuring: Core Beliefs: Patients received an overview of what a core belief is, and how they develop. Patients then began to identify their negative core beliefs. Patients worked to modify core beliefs with the goal of improved self-image and functioning.     Patient Session Goals / Objectives:    Familiarize self with the concept of core beliefs and how they develop.      Explore personal core beliefs (positive and negative)    Develop / advance recognition of the connection between negative thoughts and negative core beliefs.    Formulate new neutral/positive core beliefs               Patient Participation / Response:  Fully participated with the group by sharing personal reflections / insights and openly received / provided feedback with other participants.    Demonstrated understanding of topics discussed through group discussion and participation, Expressed understanding of the relationship between behaviors, thoughts, and feelings and Demonstrated knowledge of personal thought patterns and how they impact their mood and behavior.    Treatment Plan:  Patient has a current master individualized treatment plan.  See Epic treatment plan for more information.    Sagrario Moncada

## 2020-02-07 NOTE — GROUP NOTE
Process Group Note    PATIENT'S NAME: Fantasma Desouza  MRN:   [id number]  :   2000  ACCT. NUMBER: [id number]  DATE OF SERVICE: 20  START TIME: 10:00 AM  END TIME: 10:50 AM  FACILITATOR: Sagrario Moncada  TOPIC:  Process Group    Diagnoses:  296.32 (F33.1) Major Depressive Disorder, Recurrent Episode, Moderate _300.00 (F41.9) Unspecified Anxiety Disorder       Adult Mental Health Day Treatment  TRACK: 1A    NUMBER OF PARTICIPANTS: 9          Data:    Session content: At the start of this group, patients were invited to check in by identifying themselves, describing their current emotional status, and identifying issues to address in this group.   Area(s) of treatment focus addressed in this session included Symptom Management, Personal Safety, Develop / Improve Independent Living Skills and Develop Socialization / Interpersonal Relationship Skills.  Fantasma reported having a bad few days with continued rollercoaster feling.  He reported feeling on edge at all times. He feels obsessed with MMA training and when he is not doing it it is all he can think about.  He reported feeling happy when doing MMA but worries he will seem dangerous to others and was scared when he almost knocked someone out while training.  He reported cognitive distortions of catastrophizing and labelling.        Therapeutic Interventions/Treatment Strategies:  Psychotherapist offered support, feedback and validation and reinforced use of skills. Treatment modalities used include Motivational Interviewing, Cognitive Behavioral Therapy and Dialectical Behavioral Therapy.  Validated and normalized.  Highlighted and reinforced skills used; connected to positive outcomes.  Provided additional skill suggestions.  Identified and challenged cognitive distortions. Aided in creating a plan to help work towards goals.      Assessment:    Patient response:   Patient responded to session by accepting feedback, giving feedback, listening, focusing on  goals, being attentive and accepting support    Possible barriers to participation / learning include: and no barriers identified    Health Issues:   None reported       Substance Use Review:   Substance Use: No active concerns identified.    Mental Status/Behavioral Observations  Appearance:   Appropriate   Eye Contact:   Poor  Psychomotor Behavior: Normal   Attitude:   Cooperative   Orientation:   All  Speech   Rate / Production: Normal    Volume:  Normal   Mood:    Anxious  Depressed   Affect:    Appropriate   Thought Content:   Rumination  Thought Form:  Coherent  Logical     Insight:    Fair     Plan:     Safety Plan: No current safety concerns identified.  Recommended that patient call 911 or go to the local ED should there be a change in any of these risk factors.     Barriers to treatment: None identified    Patient Contracts (see media tab):  None    Substance Use: Not addressed in session     Continue or Discharge: Patient will continue in Adult Day Treatment (ADT)  as planned. Patient is likely to benefit from learning and using skills as they work toward the goals identified in their treatment plan.      Sagrario Moncada  February 7, 2020

## 2020-02-07 NOTE — GROUP NOTE
RN Group Note    PATIENT'S NAME: Fantasma Desouza  MRN:   [id number]  :   2000  [id number]  DATE OF SERVICE: 20  START TIME:  9:00 AM  END TIME:  9:50 AM  FACILITATOR: Sandra Hammond RN  TOPIC:  RN Group: Mind/Body Practice & Complementary  Adult Mental Health Day Treatment  TRACK: 1A    NUMBER OF PARTICIPANTS: 9    Summary of Group / Topics Discussed:  Mind/Body Practice & Complementary Therapies:  Progressive Muscle Relaxation: In addition to affecting our mood and behavior, psychological stress can cause a myriad of physical symptoms in our body. Patients were educated on these effects and guided to increased self-awareness of how stress affects their body. The purpose, benefits, history, and techniques of progressive muscle relaxation were discussed. In an instructor guided experiential, patients were guided to practice PMR to help reduce physical symptoms of psychological stress and achieve a more balanced feeling of well-being.    Patient Session Goals / Objectives:  ? Identified physiological symptoms of stress on the body  ? Listed & Explained the purpose and benefits to practicing PMR   ? Practiced progressive muscle relaxation experiential        Patient Participation / Response:  Fully participated with the group by sharing personal reflections / insights and openly received / provided feedback with other participants.    Demonstrated understanding of topics discussed through group discussion and participation    Treatment Plan:  Patient has a current master individualized treatment plan.  See Epic treatment plan for more information.    Sandra Hammond RN

## 2020-02-10 ENCOUNTER — HOSPITAL ENCOUNTER (OUTPATIENT)
Dept: BEHAVIORAL HEALTH | Facility: CLINIC | Age: 20
End: 2020-02-10
Attending: PSYCHIATRY & NEUROLOGY
Payer: COMMERCIAL

## 2020-02-10 PROCEDURE — 90853 GROUP PSYCHOTHERAPY: CPT

## 2020-02-10 PROCEDURE — G0177 OPPS/PHP; TRAIN & EDUC SERV: HCPCS

## 2020-02-10 NOTE — GROUP NOTE
Life Skills/OT Group Note    PATIENT'S NAME: Fantasma Desouza  MRN:   [id number]  :   2000  ACCT. NUMBER: [id number]  DATE OF SERVICE: 2/10/20  START TIME: 11:00 AM  END TIME: 11:50 AM  FACILITATOR: Lawrence Maher OTR/L  TOPIC:  Life Skills Group: Lifestyle Balance and Structure  Adult Mental Health Day Treatment  TRACK: 1A    NUMBER OF PARTICIPANTS: 9    Summary of Group / Topics Discussed:  Lifestyle Balance and Strucure:  Physical Fitness Self-Assessment: Patients were assisted to identify meaningful roles that they want to promote and the impact their mental health symptoms have on this.  Patients learned, applied, and generalized skills needed to live and participate in meaningful roles as effectively and independently as possible.  Patients developed awareness of strengths and challenges in fulfilling these roles and worked on integrating specific and individualized rituals and habits into their daily life.     Patient Session Goals / Objectives:    Improved awareness and engagement in life s meaningful roles, routines, habits, and rituals    Explored and identified current roles and challenges due to mental health symptoms     Identified ways to establish and integrate daily self care and wellness routines and habits to support mental health recovery    Practiced and reflected on how to generalize taught skills to their everyday life      Patient Participation / Response:  Fully participated with the group by sharing personal reflections / insights and openly received / provided feedback with other participants.    Patient presentation: Calm,alert and focused with stable mood and thought process.    Treatment Plan:  Patient has a current master individualized treatment plan.  See Epic treatment plan for more information.    MARY KATE Mccarthy/MTAEUS

## 2020-02-10 NOTE — GROUP NOTE
Process Group Note    PATIENT'S NAME: Fantasma Desouza  MRN:   [id number]  :   2000  ACCT. NUMBER: [id number]  DATE OF SERVICE: 2/10/20  START TIME:  9:00 AM  END TIME:  9:50 AM  FACILITATOR: Sagrario Moncada  TOPIC:  Process Group    Diagnoses:  296.32 (F33.1) Major Depressive Disorder, Recurrent Episode, Moderate _300.00 (F41.9) Unspecified Anxiety Disorder       Adult Mental Health Day Treatment  TRACK: 1A    NUMBER OF PARTICIPANTS: 9  Violet Hubbard sat in as second staff          Data:    Session content: At the start of this group, patients were invited to check in by identifying themselves, describing their current emotional status, and identifying issues to address in this group.   Area(s) of treatment focus addressed in this session included Symptom Management, Personal Safety, Develop / Improve Independent Living Skills and Develop Socialization / Interpersonal Relationship Skills.  Fantasma reported feeling better than last week.  He reported stabilizing after feeling like he was on a roller coaster.  He reported maintaining his routine with the gym which helped. He agreed he was urge surfing to get through difficult time.  He reported some mixed feelings about an event where a  came up to him and his family at dinner and asked him to come take pictures.  He reported this helped self esteem until he got the photos back and then noticed increased negative self judgments. He reported challenging thoughts.      Therapeutic Interventions/Treatment Strategies:  Psychotherapist offered support, feedback and validation and reinforced use of skills. Treatment modalities used include Motivational Interviewing, Cognitive Behavioral Therapy and Dialectical Behavioral Therapy.  Validated and normalized.  Highlighted and reinforced skills used; connected to positive outcomes.  Provided additional skill suggestions. Reflected urge surfing skills.  Identified and challenged cognitive distortions.  Aided  in creating a plan to help work towards goals.      Assessment:    Patient response:   Patient responded to session by accepting feedback, giving feedback, listening, focusing on goals, being attentive and accepting support    Possible barriers to participation / learning include: and no barriers identified    Health Issues:   None reported       Substance Use Review:   Substance Use: No active concerns identified.    Mental Status/Behavioral Observations  Appearance:   Appropriate   Eye Contact:   Fair   Psychomotor Behavior: Normal   Attitude:   Cooperative   Orientation:   All  Speech   Rate / Production: Normal    Volume:  Normal   Mood:    Depressed  Normal  Affect:    Appropriate   Thought Content:   Rumination  Thought Form:  Coherent  Logical     Insight:    Fair     Plan:     Safety Plan: No current safety concerns identified.  Recommended that patient call 911 or go to the local ED should there be a change in any of these risk factors.     Barriers to treatment: None identified    Patient Contracts (see media tab):  None    Substance Use: Not addressed in session     Continue or Discharge: Patient will continue in Adult Day Treatment (ADT)  as planned. Patient is likely to benefit from learning and using skills as they work toward the goals identified in their treatment plan.      Sagrario Moncada  February 10, 2020

## 2020-02-10 NOTE — GROUP NOTE
EBP Group Note    PATIENT'S NAME: Fantasma Desouza  MRN:   [id number]  :   2000  ACCT. NUMBER: [id number]  DATE OF SERVICE: 2/10/20  START TIME: 10:00 AM  END TIME: 10:50 AM  FACILITATOR: Sagrario Moncada  TOPIC:  EBP Group: Cognitive Restructuring  Adult Mental Health Day Treatment  TRACK: 1A    NUMBER OF PARTICIPANTS: 9  Mae Quintero present as second staff    Summary of Group / Topics Discussed:  Cognitive Restructuring: Perfectionism: Patients discussed and reflected on what perfectionism is, how it develops, and how it impacts functioning. Ways to challenge perfectionism were explored and discussed by the group, with the goal of challenging perfectionistic thinking and improving functioning.    Patient Session Goals / Objectives:    Understand the concept of perfectionistic thoughts and how they develop.     Increase recognition of the connection between perfectionistic thinking and symptoms.    Explore and practice new ways to challenge the perfectionistic stance and replace with reasonable expectations of self and others.    Begin to formulate realistic personal expectations and goals.               Patient Participation / Response:  Fully participated with the group by sharing personal reflections / insights and openly received / provided feedback with other participants.    Demonstrated understanding of topics discussed through group discussion and participation, Expressed understanding of the relationship between behaviors, thoughts, and feelings and Demonstrated knowledge of personal thought patterns and how they impact their mood and behavior.    Treatment Plan:  Patient has a current master individualized treatment plan.  See Epic treatment plan for more information.    Sagrario Moncada

## 2020-02-12 ENCOUNTER — HOSPITAL ENCOUNTER (OUTPATIENT)
Dept: BEHAVIORAL HEALTH | Facility: CLINIC | Age: 20
End: 2020-02-12
Attending: PSYCHIATRY & NEUROLOGY
Payer: COMMERCIAL

## 2020-02-12 PROCEDURE — G0177 OPPS/PHP; TRAIN & EDUC SERV: HCPCS | Performed by: SOCIAL WORKER

## 2020-02-12 PROCEDURE — 90853 GROUP PSYCHOTHERAPY: CPT

## 2020-02-12 PROCEDURE — 90853 GROUP PSYCHOTHERAPY: CPT | Performed by: COUNSELOR

## 2020-02-12 PROCEDURE — 90853 GROUP PSYCHOTHERAPY: CPT | Performed by: SOCIAL WORKER

## 2020-02-12 NOTE — ADDENDUM NOTE
Encounter addended by: Savanna Mckeon, LICSW on: 2/12/2020 12:57 PM   Actions taken: Charge Capture section accepted

## 2020-02-12 NOTE — GROUP NOTE
EBP Group Note    PATIENT'S NAME: Fantasma Desouza  MRN:   [id number]  :   2000  [id number]  DATE OF SERVICE: 20  START TIME: 11:00 AM  END TIME: 11:50 AM  FACILITATOR: Savanna Mckeon LICSW  TOPIC:  EBP Group: Coping Skills  Adult Mental Health Day Treatment  TRACK: 1A    NUMBER OF PARTICIPANTS: 7    Summary of Group / Topics Discussed:  Coping Skills: Meditation: Patients learned about meditation and explored how and when to utilize it to increase focus, reduce mental health symptoms, decrease physical tension, and improve mental well-being.  Approaches to meditation were presented as a means of increasing self-awareness. The benefits of various meditation practices were discussed, as well as barriers to utilization of this coping strategy.     Patient Session Goals / Objectives:    Understand the purpose and efficacy of using meditation modalities to reduce stress / symptoms.    Review / discuss situations in daily life that cause distress, where establishing a meditation routine or meditating as needed may improve functioning.      Verbalize understanding of how and when to apply grounding strategies to reduce distress and increase presence in the moment.    Practice meditation and address barriers to use in daily life.        Patient Participation / Response:  Moderately participated, sharing some personal reflections / insights and adequately adequately received / provided feedback with other participants.    Demonstrated understanding of topics discussed through group discussion and participation    Treatment Plan:  Patient has a current master individualized treatment plan.  See Epic treatment plan for more information.    PIYUSH Barboza

## 2020-02-12 NOTE — GROUP NOTE
EBP Group Note    PATIENT'S NAME: Fantasma Desouza  MRN:   [id number]  :   2000  ACCT. NUMBER: [id number]  DATE OF SERVICE: 20  START TIME: 10:00 AM  END TIME: 10:50 AM  FACILITATOR: Sagrario Moncada  TOPIC:  EBP Group: Symptom Awareness  Adult Mental Health Day Treatment  TRACK: 1A    NUMBER OF PARTICIPANTS: 7  Ashleigh Ortiz sat in as second staff    Summary of Group / Topics Discussed:  Symptom Awareness: Mood Disorders: Patients received a general overview of mood disorders including depressive disorders, anxiety disorders, and bipolar disorders and how it relates to their current symptoms. The purpose is to promote understanding of their diagnoses and how it impacts their functioning. Patients reviewed their current awareness of symptoms and diagnoses. Patients received information regarding diagnoses, etiology, cultural, and environmental factors as well as impact on functioning.     Patient Session Goals / Objectives:    Discussed patient individual symptoms and experiences    Reviewed diagnostic criteria and etiology of diagnoses         Patient Participation / Response:  Fully participated with the group by sharing personal reflections / insights and openly received / provided feedback with other participants.    Demonstrated understanding of topics discussed through group discussion and participation, Demonstrated understanding of how information regarding symptoms can assist in management of symptoms and Identified / Expressed personal readiness to increase awareness of symptoms and apply skills as necessary    Treatment Plan:  Patient has a current master individualized treatment plan.  See Epic treatment plan for more information.    Sagrario Moncada

## 2020-02-14 ENCOUNTER — HOSPITAL ENCOUNTER (OUTPATIENT)
Dept: BEHAVIORAL HEALTH | Facility: CLINIC | Age: 20
End: 2020-02-14
Attending: PSYCHIATRY & NEUROLOGY
Payer: COMMERCIAL

## 2020-02-14 PROCEDURE — 90853 GROUP PSYCHOTHERAPY: CPT

## 2020-02-14 PROCEDURE — G0177 OPPS/PHP; TRAIN & EDUC SERV: HCPCS

## 2020-02-14 RX ORDER — MIRTAZAPINE 15 MG/1
15 TABLET, FILM COATED ORAL AT BEDTIME
COMMUNITY

## 2020-02-14 NOTE — GROUP NOTE
"Process Group Note    PATIENT'S NAME: Fantasma Desouza  MRN:   [id number]  :   2000  ACCT. NUMBER: [id number]  DATE OF SERVICE: 20  START TIME:  9:00 AM  END TIME:  9:50 AM  FACILITATOR: Sagrario Moncada  TOPIC:  Process Group    Diagnoses:  296.32 (F33.1) Major Depressive Disorder, Recurrent Episode, Moderate _300.00 (F41.9) Unspecified Anxiety Disorder         Adult Mental Health Day Treatment  TRACK: 1A    NUMBER OF PARTICIPANTS: 7          Data:    Session content: At the start of this group, patients were invited to check in by identifying themselves, describing their current emotional status, and identifying issues to address in this group.   Area(s) of treatment focus addressed in this session included Symptom Management, Personal Safety, Develop / Improve Independent Living Skills and Develop Socialization / Interpersonal Relationship Skills.  Fantasma reported elevated distress due to berrios's day.  He reported thinking it would be a bad day so he feels he is acting in away so as to make it a bad day.  He reported taking three of his evening medications last night instead of his prescribed one.  He reported there was no suicidal intent but instead just felt like \"f- it, let's see what happens.\"  He reported no current suicidal ideation but reports self injury urges.  He reported feelings of panic and depression triggered when thinking about complicated romantic relationships situation.      Therapeutic Interventions/Treatment Strategies:  Psychotherapist offered support, feedback and validation and reinforced use of skills. Treatment modalities used include Motivational Interviewing, Cognitive Behavioral Therapy and Dialectical Behavioral Therapy.  Validated and normalized.  Highlighted and reinforced skills used; connected to positive outcomes.  Provided additional skill suggestions.  Identified and challenged cognitive distortions.  Taught on harm reduction strategies and engaged in safety " planning.  Aided in creating a plan to help work towards goals.      Assessment:    Patient response:   Patient responded to session by accepting feedback, giving feedback, listening, focusing on goals, being attentive and accepting support    Possible barriers to participation / learning include: and no barriers identified    Health Issues:   Yes: Sleep disturbance, Associated Psychological Distress       Substance Use Review:   Substance Use: No active concerns identified.    Mental Status/Behavioral Observations  Appearance:   Appropriate   Eye Contact:   Poor  Psychomotor Behavior: Normal   Attitude:   Cooperative   Orientation:   All  Speech   Rate / Production: Normal    Volume:  Normal   Mood:    Anxious  Depressed   Affect:    Appropriate   Thought Content:   Rumination and Safety reports  presence of suicidal ideation passive suicidal ideation  and thoughts of self-harm and Safety concerns have increased  Thought Form:  Coherent  Obsessive     Insight:    Fair     Plan:     Safety Plan: Committed to safety and agreed to follow previously developed safety coping plan.      Barriers to treatment: None identified    Patient Contracts (see media tab):  None    Substance Use: Not addressed in session     Continue or Discharge: Patient will continue in Adult Day Treatment (ADT)  as planned. Patient is likely to benefit from learning and using skills as they work toward the goals identified in their treatment plan.      Sagrario Moncada  February 14, 2020

## 2020-02-14 NOTE — GROUP NOTE
EBP Group Note    PATIENT'S NAME: Fantasma Desouza  MRN:   [id number]  :   2000  ACCT. NUMBER: [id number]  DATE OF SERVICE: 20  START TIME: 10:00 AM  END TIME: 10:50 AM  FACILITATOR: Sagrario Moncada  TOPIC:  EBP Group: Relationship Skills  Adult Mental Health Day Treatment  TRACK: 1A    NUMBER OF PARTICIPANTS: 7    Summary of Group / Topics Discussed:  Relationship Skills: Conflict Resolution: Topic of conflict resolution in interpersonal communication was discussed. Patients received an overview of how communication skills during times of conflict impact symptoms and functioning. Patients discussed their current communication challenges and how this impacts their functioning. Patients also verbalized understanding of skills learned and practiced in session.     Patient Session Goals / Objectives:    Identified and discussed patient individual challenges with communication    Presented and practiced effective communication skills in session    Assisted patients in implementing more effective communication skills in their relationships      Patient Participation / Response:  Minimally participated, only when prompted / asked.    Demonstrated understanding of topics discussed through group discussion and participation    Treatment Plan:  Patient has a current master individualized treatment plan.  See Epic treatment plan for more information.    Sagrario Moncada

## 2020-02-14 NOTE — GROUP NOTE
RN Group Note    PATIENT'S NAME: Fantasma Desouza  MRN:   [id number]  :   2000  ACCT. NUMBER: [id number]  DATE OF SERVICE: 20  START TIME:  9:00 AM  END TIME:  9:50 AM  FACILITATOR: Sandra Hammond RN  TOPIC:  RN Group: Medication Education and Management  Adult Mental Health Day Treatment  TRACK: 1A    NUMBER OF PARTICIPANTS: 7    Summary of Group / Topics Discussed:  Medication Educations and Management:  Medication Assessment/Review: Patients were given a medication quiz to assess their current knowledge about the medications that are prescribed and why they are taking them. Medication lists were reviewed with each patient individually to provide education and answer questions. Safe medication storage, handling, management, and disposal were discussed within the group. Patients completed medication wallet cards    Patient Session Goals / Objectives:  ? Assessed patient understanding of their current medications and indication  ? Identify what to do if a dose is missed  ? Assessed medication adherence  ? Assessed knowledge of medication side effects and how to treat them      Patient Participation / Response:  Minimally participated, only when prompted / asked.     Verbalized understanding of medication education and management topic    Treatment Plan:  Patient has a current master individualized treatment plan.  See Epic treatment plan for more information.    Sandra Hammond RN

## 2020-02-17 ENCOUNTER — HOSPITAL ENCOUNTER (OUTPATIENT)
Dept: BEHAVIORAL HEALTH | Facility: CLINIC | Age: 20
End: 2020-02-17
Attending: PSYCHIATRY & NEUROLOGY
Payer: COMMERCIAL

## 2020-02-17 PROCEDURE — G0177 OPPS/PHP; TRAIN & EDUC SERV: HCPCS

## 2020-02-17 PROCEDURE — 90853 GROUP PSYCHOTHERAPY: CPT

## 2020-02-17 NOTE — GROUP NOTE
Life Skills/OT Group Note    PATIENT'S NAME: Fantasma Desouza  MRN:   [id number]  :   2000  ACCT. NUMBER: [id number]  DATE OF SERVICE: 20  START TIME:  9:00 AM  END TIME:  9:50 AM  FACILITATOR: Fabio Albright OT  TOPIC:  Life Skills Group: Sensory Approaches in Mental Health  Adult Mental Health Day Treatment  TRACK: 1A    NUMBER OF PARTICIPANTS: 8    Summary of Group / Topics Discussed:  Sensory Approaches in Mental Health:  Focused Activity: Patients were provided with verbal and experiential education to identify physical and sensorimotor based activities that can be utilized for stress management, self care, health maintenance, and self regulation.  Patients increased knowledge and awareness of activities that support good self care, build resiliency, and prevent relapse through healthy engagement in mindful focused activities for effective coping with illness and reduction of maladaptive coping skills.     Patient Session Goals / Objectives:    Identified specific physical and sensorimotor based activities for stress management, self care, health maintenance, and self regulation      Improved awareness of activities that are meaningful focused activities that support good self care, build resiliency, and prevent relapse and how this applies to current daily life    Established a plan for practice of these skills in their own environments    Practiced and reflected on how to generalize taught skills to their everyday life      Patient Participation / Response:  Fully participated with the group by sharing personal reflections / insights and openly received / provided feedback with other participants.    Verbalized understanding of content and Patient would benefit from additional opportunities to practice the content to be able to generalize it to their everyday life with increased intentionality, consistency, and efficacy in support of their psychiatric recovery    Treatment Plan:  Patient has a  current master individualized treatment plan.  See Epic treatment plan for more information.    Fabio Albright, OT

## 2020-02-17 NOTE — GROUP NOTE
Process Group Note    PATIENT'S NAME: Fantasma Desouza  MRN:   [id number]  :   2000  ACCT. NUMBER: [id number]  DATE OF SERVICE: 20  START TIME: 10:00 AM  END TIME: 10:50 AM  FACILITATOR: Sagrario Moncada  TOPIC:  Process Group    Diagnoses:  296.32 (F33.1) Major Depressive Disorder, Recurrent Episode, Moderate _300.00 (F41.9) Unspecified Anxiety Disorder       Adult Mental Health Day Treatment  TRACK: 1A    NUMBER OF PARTICIPANTS: 8          Data:    Session content: At the start of this group, patients were invited to check in by identifying themselves, describing their current emotional status, and identifying issues to address in this group.   Area(s) of treatment focus addressed in this session included Symptom Management, Personal Safety, Develop / Improve Independent Living Skills and Develop Socialization / Interpersonal Relationship Skills.  Fantasma reported being in a destructive mindset but in a moment where he was more positive he texted  at gym to see if there were any MMA fights he could participate in so he would be distracted and out of the house.  He reported not wanting to be home alone or worried about self harming.  He did leave to do an MMA fight and won which felt good.  He reported feeling he is radically accepting that his relationship with girlfriend is over and is trying to cope effectively.  He reported mood is now much more positive.      Therapeutic Interventions/Treatment Strategies:  Psychotherapist offered support, feedback and validation and reinforced use of skills. Treatment modalities used include Motivational Interviewing, Cognitive Behavioral Therapy and Dialectical Behavioral Therapy.  Validated and normalized.  Highlighted and reinforced skills used; connected to positive outcomes.  Provided additional skill suggestions.  Aided in creating a plan to help work towards goals.      Assessment:    Patient response:   Patient responded to session by accepting feedback,  giving feedback, listening, focusing on goals, being attentive and accepting support    Possible barriers to participation / learning include: and no barriers identified    Health Issues:   None reported       Substance Use Review:   Substance Use: No active concerns identified.    Mental Status/Behavioral Observations  Appearance:   Appropriate   Eye Contact:   Poor  Psychomotor Behavior: Normal   Attitude:   Cooperative   Orientation:   All  Speech   Rate / Production: Normal    Volume:  Normal   Mood:    Depressed   Affect:    Appropriate   Thought Content:   Safety reports  thoughts of self-harm  Thought Form:  Coherent  Logical     Insight:    Fair     Plan:     Safety Plan: Committed to safety and agreed to follow previously developed safety coping plan.      Barriers to treatment: None identified    Patient Contracts (see media tab):  None    Substance Use: Not addressed in session     Continue or Discharge: Patient will continue in Adult Day Treatment (ADT)  as planned. Patient is likely to benefit from learning and using skills as they work toward the goals identified in their treatment plan.      Sagrario Moncada  February 17, 2020

## 2020-02-17 NOTE — GROUP NOTE
EBP Group Note    PATIENT'S NAME: Fantasma Desouza  MRN:   [id number]  :   2000  ACCT. NUMBER: [id number]  DATE OF SERVICE: 20  START TIME: 11:00 AM  END TIME: 11:50 AM  FACILITATOR: Sagrario Moncada  TOPIC:  EBP Group: Coping Skills  Adult Mental Health Day Treatment  TRACK: 1A    NUMBER OF PARTICIPANTS: 7    Summary of Group / Topics Discussed:  Coping Skills: Self-Soothe: Patients learned to apply self-soothe as a way to decrease heightened stress in the moment.  Patients identified situations that necessitate self-soothe strategies.  They focused on ways to manage physical symptoms of distress using the senses. They discussed how to distinguish when this can be useful in their lives when other strategies are more relevant or helpful.    Patient Session Goals / Objectives:    Understand the purpose of using the senses to decrease distress    Process what happens in the body when using self-soothe strategies    Demonstrate understanding of when to use self-soothe strategies    Identify and problem solve barriers to applying self-soothe strategies.    Choose 1-2 self-soothe strategies to apply during times of distress.        Patient Participation / Response:  Fully participated with the group by sharing personal reflections / insights and openly received / provided feedback with other participants.    Demonstrated understanding of topics discussed through group discussion and participation, Expressed understanding of the relevance / importance of coping skills at distressing times in life and Demonstrated knowledge of when to consider using a variety of coping skills in daily life    Treatment Plan:  Patient has a current master individualized treatment plan.  See Epic treatment plan for more information.    Sagrario Moncada

## 2020-02-19 ENCOUNTER — HOSPITAL ENCOUNTER (OUTPATIENT)
Dept: BEHAVIORAL HEALTH | Facility: CLINIC | Age: 20
End: 2020-02-19
Attending: PSYCHIATRY & NEUROLOGY
Payer: COMMERCIAL

## 2020-02-19 PROCEDURE — 90853 GROUP PSYCHOTHERAPY: CPT

## 2020-02-19 PROCEDURE — G0177 OPPS/PHP; TRAIN & EDUC SERV: HCPCS

## 2020-02-19 NOTE — GROUP NOTE
Process Group Note    PATIENT'S NAME: Fantasma Desouza  MRN:   [id number]  :   2000  ACCT. NUMBER: [id number]  DATE OF SERVICE: 20  START TIME:  9:00 AM  END TIME:  9:50 AM  FACILITATOR: Sagrario Moncada  TOPIC:  Process Group    Diagnoses:  296.32 (F33.1) Major Depressive Disorder, Recurrent Episode, Moderate _300.00 (F41.9) Unspecified Anxiety Disorder       Adult Mental Health Day Treatment  TRACK: 1A    NUMBER OF PARTICIPANTS: 8          Data:    Session content: At the start of this group, patients were invited to check in by identifying themselves, describing their current emotional status, and identifying issues to address in this group.   Area(s) of treatment focus addressed in this session included Symptom Management, Personal Safety, Develop / Improve Independent Living Skills and Develop Socialization / Interpersonal Relationship Skills.  Fantasma reported being in a good mood.  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  He reported that he has started talking to a new girl and notices negative self judgments have arisen that he is somehow wronging previous girlfriend despite that they have not been together for months.  He reported trying to challenge these thoughts and enjoy companionship.  He reported working on maintaining positive mood.      Therapeutic Interventions/Treatment Strategies:  Psychotherapist offered support, feedback and validation and reinforced use of skills. Treatment modalities used include Motivational Interviewing, Cognitive Behavioral Therapy and Dialectical Behavioral Therapy.  Validated and normalized.  Highlighted and reinforced skills used; connected to positive outcomes.  Provided additional skill suggestions.  Identified and challenged cognitive distortions.  Aided in creating a plan to help work towards goals.      Assessment:    Patient response:   Patient responded to session by accepting feedback, giving feedback,  listening, focusing on goals, being attentive and accepting support    Possible barriers to participation / learning include: and no barriers identified    Health Issues:   None reported       Substance Use Review:   Substance Use: No active concerns identified.    Mental Status/Behavioral Observations  Appearance:   Appropriate   Eye Contact:   Good   Psychomotor Behavior: Normal   Attitude:   Cooperative   Orientation:   All  Speech   Rate / Production: Normal    Volume:  Normal   Mood:    Anxious   Affect:    Appropriate   Thought Content:   Clear  Thought Form:  Coherent  Logical     Insight:    Good     Plan:     Safety Plan: No current safety concerns identified.  Recommended that patient call 911 or go to the local ED should there be a change in any of these risk factors.     Barriers to treatment: None identified    Patient Contracts (see media tab):  None    Substance Use: Not addressed in session     Continue or Discharge: Patient will continue in Adult Day Treatment (ADT)  as planned. Patient is likely to benefit from learning and using skills as they work toward the goals identified in their treatment plan.      Sagrario Moncada  February 19, 2020

## 2020-02-19 NOTE — GROUP NOTE
EBP Group Note    PATIENT'S NAME: Fantasma Desouza  MRN:   [id number]  :   2000  ACCT. NUMBER: [id number]  DATE OF SERVICE: 20  START TIME: 10:00 AM  END TIME: 10:50 AM  FACILITATOR: Sagrario Moncada  TOPIC:  EBP Group: Coping Skills  Adult Mental Health Day Treatment  TRACK: 1A    NUMBER OF PARTICIPANTS: 8    Summary of Group / Topics Discussed:  Coping Skills: Distraction: Patients learned to mindfully use distraction as a way to decrease heightened stress in the moment.  Patients will identified situations that necessitate healthy distraction strategies.  They explored ways to manage physical symptoms of distress using distraction. The group began to distinguish when this can be useful in their lives or when other strategies would be more relevant or helpful.    Patient Session Goals / Objectives:    Understand the purpose and benefits of using healthy distraction to decrease distress.    Process what happens in the body when using distraction strategies.    Demonstrate understanding of when to use distraction strategies.    Explore patient s current distraction activities, and how to take a more intentional approach to the use of distraction.    Identify and problem solve barriers to applying distraction strategies.    Choose 1-2 healthy distraction strategies to apply during times of distress.        Patient Participation / Response:  Fully participated with the group by sharing personal reflections / insights and openly received / provided feedback with other participants.    Demonstrated understanding of topics discussed through group discussion and participation, Expressed understanding of the relevance / importance of coping skills at distressing times in life and Demonstrated knowledge of when to consider using a variety of coping skills in daily life    Treatment Plan:  Patient has a current master individualized treatment plan.  See Epic treatment plan for more information.    Sagrario PALMA  [place]

## 2020-02-19 NOTE — GROUP NOTE
Life Skills/OT Group Note    PATIENT'S NAME: Fantasma Desouza  MRN:   [id number]  :   2000  ACCT. NUMBER: [id number]  DATE OF SERVICE: 20  START TIME: 11:00 AM  END TIME: 11:50 AM  FACILITATOR: Fabio Albright OT  TOPIC:  Life Skills Group: Sensory Approaches in Mental Health  Adult Mental Health Day Treatment  TRACK: 1A    NUMBER OF PARTICIPANTS: 8    Summary of Group / Topics Discussed:  Self-Regulation Skills: Sensory Modulation: Patients were provided with education on Autonomic Nervous System activation and the importance of identifying their Window of Tolerance for effective self-regulation.  Patients were provided psycheducation on interoception and self awareness and taught how to recognize specific signs and symptoms of their individualized state of arousal to help them identify what their needs are whether they need calming or activating to stay/be in their window of tolerance (safe, comfortable, social engaging, productive, effective).  Patient's explored body based neurosensory skills (bottom up processing skills) and techniques to help manage symptoms and change level of arousal when needed to be in control and comfortable so they are able to function in different environments.         Patient Session Goals / Objectives:    [place] how the ANS works and importance of its' impact on functioning and mental wellbeing    [place] how developing interoceptive awareness can help one self-regulate sooner rather than later    Identified signs and symptoms of current level of arousal and ways to make changes in level of arousal when needed    Identified specific and individualized neurosensory skills to help when distressed and for crisis management    Established a plan for practice of these skills in their own environments      Patient Participation / Response:  Fully participated with the group by sharing personal reflections / insights and openly received / provided feedback with other  participants.    Verbalized understanding of content and Patient would benefit from additional opportunities to practice the content to be able to generalize it to their everyday life with increased intentionality, consistency, and efficacy in support of their psychiatric recovery    Treatment Plan:  Patient has a current master individualized treatment plan.  See Epic treatment plan for more information.    Fabio Albright, OT

## 2020-02-21 ENCOUNTER — HOSPITAL ENCOUNTER (OUTPATIENT)
Dept: BEHAVIORAL HEALTH | Facility: CLINIC | Age: 20
End: 2020-02-21
Attending: PSYCHIATRY & NEUROLOGY
Payer: COMMERCIAL

## 2020-02-21 PROCEDURE — G0177 OPPS/PHP; TRAIN & EDUC SERV: HCPCS

## 2020-02-21 PROCEDURE — 90853 GROUP PSYCHOTHERAPY: CPT

## 2020-02-21 NOTE — GROUP NOTE
"Process Group Note    PATIENT'S NAME: Fantasma Desouza  MRN:   [id number]  :   2000  ACCT. NUMBER: [id number]  DATE OF SERVICE: 20  START TIME: 10:00 AM  END TIME: 10:50 AM  FACILITATOR: Sagrario Moncada  TOPIC:  Process Group    Diagnoses:  296.32 (F33.1) Major Depressive Disorder, Recurrent Episode, Moderate _300.00 (F41.9) Unspecified Anxiety Disorder       Adult Mental Health Day Treatment  TRACK: 1A    NUMBER OF PARTICIPANTS: 7          Data:    Session content: At the start of this group, patients were invited to check in by identifying themselves, describing their current emotional status, and identifying issues to address in this group.   Area(s) of treatment focus addressed in this session included Symptom Management, Personal Safety, Develop / Improve Independent Living Skills and Develop Socialization / Interpersonal Relationship Skills.  Fantasma reported feeling like a lot of things hit him all at once.  He reported \"debilitating paranoia.\"  He reported history of this where he worried that once he left someone they would die and he feels this is back again.  He reported this was triggered by having a vivid dream where his girlfriend/exgirlfriend .  He reported feeling even if he can be happy life is purposeless and he has existential thoughts  He denies any thoughts of suicide.  He reported fear that if he becomes happy he will \"lose what makes him interesting.\"    Therapeutic Interventions/Treatment Strategies:  Psychotherapist offered support, feedback and validation and reinforced use of skills. Treatment modalities used include Motivational Interviewing, Cognitive Behavioral Therapy and Dialectical Behavioral Therapy.  Validated and normalized.  Highlighted and reinforced skills used; connected to positive outcomes.  Provided additional skill suggestions.  Identified and challenged cognitive distortions.  Reflected on self sabotage.  Aided in creating a plan to help work towards goals.  "     Assessment:    Patient response:   Patient responded to session by accepting feedback, listening, focusing on goals, being attentive and accepting support    Possible barriers to participation / learning include: and no barriers identified    Health Issues:   Yes: Sleep disturbance, Associated Psychological Distress       Substance Use Review:   Substance Use: No active concerns identified.    Mental Status/Behavioral Observations  Appearance:   Appropriate   Eye Contact:   Poor  Psychomotor Behavior: Normal   Attitude:   Cooperative   Orientation:   All  Speech   Rate / Production: Normal    Volume:  Normal   Mood:    Anxious  Depressed   Affect:    Appropriate   Thought Content:   Rumination  Thought Form:  Coherent  Logical     Insight:    Fair     Plan:     Safety Plan: No current safety concerns identified.  Recommended that patient call 911 or go to the local ED should there be a change in any of these risk factors.     Barriers to treatment: None identified    Patient Contracts (see media tab):  None    Substance Use: Not addressed in session     Continue or Discharge: Patient will continue in Adult Day Treatment (ADT)  as planned. Patient is likely to benefit from learning and using skills as they work toward the goals identified in their treatment plan.      Sagrario Moncada  February 21, 2020

## 2020-02-21 NOTE — GROUP NOTE
EBP Group Note    PATIENT'S NAME: Fantasma Desouza  MRN:   [id number]  :   2000  ACCT. NUMBER: [id number]  DATE OF SERVICE: 20  START TIME: 11:00 AM  END TIME: 11:50 AM  FACILITATOR: Sagrario Moncada  TOPIC:  EBP Group: Coping Skills  Adult Mental Health Day Treatment  TRACK: 1A    NUMBER OF PARTICIPANTS: 7    Summary of Group / Topics Discussed:  Coping Skills: Distraction: Patients learned to mindfully use distraction as a way to decrease heightened stress in the moment.  Patients will identified situations that necessitate healthy distraction strategies.  They explored ways to manage physical symptoms of distress using distraction. The group began to distinguish when this can be useful in their lives or when other strategies would be more relevant or helpful.    Patient Session Goals / Objectives:    Understand the purpose and benefits of using healthy distraction to decrease distress.    Process what happens in the body when using distraction strategies.    Demonstrate understanding of when to use distraction strategies.    Explore patient s current distraction activities, and how to take a more intentional approach to the use of distraction.    Identify and problem solve barriers to applying distraction strategies.    Choose 1-2 healthy distraction strategies to apply during times of distress.        Patient Participation / Response:  Moderately participated, sharing some personal reflections / insights and adequately adequately received / provided feedback with other participants.    Demonstrated understanding of topics discussed through group discussion and participation, Expressed understanding of the relevance / importance of coping skills at distressing times in life and Demonstrated knowledge of when to consider using a variety of coping skills in daily life    Treatment Plan:  Patient has a current master individualized treatment plan.  See Epic treatment plan for more information.    Sagrario  LOUIE Moncada

## 2020-02-21 NOTE — ADDENDUM NOTE
Encounter addended by: Bee Hernandez RN on: 2/21/2020 4:42 PM   Actions taken: Charge Capture section accepted

## 2020-02-21 NOTE — GROUP NOTE
RN Group Note    PATIENT'S NAME: Fantasma Desouza  MRN:   [id number]  :   2000  ACCT. NUMBER: [id number]  DATE OF SERVICE: 20  START TIME:  9:00 AM  END TIME:  9:50 AM  FACILITATOR: Bee Shrestha RN  TOPIC:  RN Group: Medication Education and Management  Adult Mental Health Day Treatment  TRACK: 1A    NUMBER OF PARTICIPANTS: 6    Summary of Group / Topics Discussed:  Medication Educations and Management:  Medication Jeopardy: Patients provided education regarding medication safety, antidepressants, side effects, neuroleptics, expected medication outcomes, knowledge of diagnosis, symptoms, and symptom management through an engaging jeopardy-style format.     Patient Session Goals / Objectives:    ? Participated in team-based Subarctic Limitedopardy game  ? Identified strategies for safe use, handling, and disposal of medications  ? Discussed basic aspects of medication safety, side effects, adverse outcomes and contraindications        Patient Participation / Response:  Fully participated with the group by sharing personal reflections / insights and openly received / provided feedback with other participants.     Demonstrated understanding of topics discussed through group discussion and participation, Identified / Expressed personal readiness to practice skills and Verbalized understanding of medication education and management topic    Treatment Plan:  Patient has a current master individualized treatment plan.  See Epic treatment plan for more information.    BEE SHRESTHA, FRANTZ

## 2020-02-21 NOTE — ADDENDUM NOTE
Encounter addended by: Bee Hernandez RN on: 2/21/2020 4:43 PM   Actions taken: Charge Capture section accepted

## 2020-02-24 ENCOUNTER — HOSPITAL ENCOUNTER (OUTPATIENT)
Dept: BEHAVIORAL HEALTH | Facility: CLINIC | Age: 20
End: 2020-02-24
Attending: PSYCHIATRY & NEUROLOGY
Payer: COMMERCIAL

## 2020-02-24 PROCEDURE — 90853 GROUP PSYCHOTHERAPY: CPT | Performed by: COUNSELOR

## 2020-02-24 PROCEDURE — G0177 OPPS/PHP; TRAIN & EDUC SERV: HCPCS

## 2020-02-24 NOTE — GROUP NOTE
EBP Group Note    PATIENT'S NAME: Fantasma Desouza  MRN:   [id number]  :   2000  ACCT. NUMBER: [id number]  DATE OF SERVICE: 20  START TIME: 10:00 AM  END TIME: 10:50 AM  FACILITATOR: Ashleigh Ortiz LPCC  TOPIC:  EBP Group: Behavioral Activation  Adult Mental Health Day Treatment  TRACK: 1A    NUMBER OF PARTICIPANTS: 6    Summary of Group / Topics Discussed:  Behavioral Activation: The Change Process - Behavior Change: Patients explored the process and types of change, including but not limited to, theories of change, steps to making change, methods of changing behavior, and potential barriers.  Patients worked to identify what changes may benefit their daily lives, and work towards a plan to implement change.      Patient Session Goals / Objectives:    Demonstrate understanding of the change process.      Identify positive and negative behavioral patterns.    Make plans to track and implement changes and share experiences in group.    Identify personal barriers to change      Patient Participation / Response:  Moderately participated, sharing some personal reflections / insights and adequately adequately received / provided feedback with other participants.    Demonstrated understanding of topics discussed through group discussion and participation, Shared experiences and challenges with making behavioral changes and Identified barriers to change    Treatment Plan:  Patient has a current master individualized treatment plan.  See Epic treatment plan for more information.    JOHN Sommers

## 2020-02-24 NOTE — GROUP NOTE
"Process Group Note    PATIENT'S NAME: Fantasma Desouza  MRN:   [id number]  :   2000  ACCT. NUMBER: [id number]  DATE OF SERVICE: 20  START TIME:  9:00 AM  END TIME:  9:50 AM  FACILITATOR: Ashleigh Ortiz LPCC  TOPIC:  Process Group    Diagnoses:  296.32 (F33.1) Major Depressive Disorder, Recurrent Episode, Moderate _300.00 (F41.9) Unspecified Anxiety Disorder       Adult Mental Health Day Treatment  TRACK: 1A    NUMBER OF PARTICIPANTS: 6          Data:    Session content: At the start of this group, patients were invited to check in by identifying themselves, describing their current emotional status, and identifying issues to address in this group.   Area(s) of treatment focus addressed in this session included Symptom Management, Personal Safety and Community Resources/Discharge Planning.  Patient reported feeling \"pretty good\" after his weekend. Patient discussed working toward his goal of engaging in positive distractions by attending a concert with a friend last Friday. Patient reported he was able \"to get out of his own head\" and decreased paranoid thinking. Patient identified being effective and attending to relationships as skills they will use to address their goal(s). Patient reported paranoid thinking may be a barrier to working toward their goal(s) and/or addressing mental health symptoms. Patient reported no safety concerns and/or self-injurious behaviors. Patient reported no substance use. Patient reported they are taking their medications as prescribed. Patient reported feeling proud that they enjoyed going out with his friend. Patient discussed affirmations he will use to address paranoid thinking with the treatment group.     Therapeutic Interventions/Treatment Strategies:  Psychotherapist offered support, feedback and validation and reinforced use of skills. Treatment modalities used include Motivational Interviewing and Cognitive Behavioral Therapy. Interventions include Behavioral " Activation: Reinforced benefits/challenges of change process through applying skills to replace unwanted behaviors, Mindfulness: Encouraged a plan to use mindfulness skills in daily life and Symptoms Management: Promoted understanding of their diagnoses and how it impacts their functioning.    Assessment:    Patient response:   Patient responded to session by accepting feedback, giving feedback, listening and focusing on goals    Possible barriers to participation / learning include: and no barriers identified    Health Issues:   None reported       Substance Use Review:   Substance Use: No active concerns identified.    Mental Status/Behavioral Observations  Appearance:   Appropriate   Eye Contact:   Good   Psychomotor Behavior: Normal   Attitude:   Cooperative   Orientation:   All  Speech   Rate / Production: Normal    Volume:  Normal   Mood:    Normal  Affect:    Appropriate   Thought Content:   Clear  Thought Form:  Coherent  Logical     Insight:    Fair     Plan:     Safety Plan: No current safety concerns identified.  Recommended that patient call 911 or go to the local ED should there be a change in any of these risk factors.     Barriers to treatment: None identified    Patient Contracts (see media tab):  None    Substance Use: Not addressed in session     Continue or Discharge: Patient will continue in Adult Day Treatment (ADT)  as planned. Patient is likely to benefit from learning and using skills as they work toward the goals identified in their treatment plan.      Ashleigh Ortiz, [facility]  February 24, 2020

## 2020-02-24 NOTE — GROUP NOTE
Life Skills/OT Group Note    PATIENT'S NAME: Fantasma Desouza  MRN:   [id number]  :   2000  ACCT. NUMBER: [id number]  DATE OF SERVICE: 20  START TIME: 11:00 AM  END TIME: 11:50 AM  FACILITATOR: Lawrence Maher OTR/L  TOPIC:  Life Skills Group: Communication and Social Skills Development  Adult Mental Health Day Treatment  TRACK: 1A    NUMBER OF PARTICIPANTS: 6    Summary of Group / Topics Discussed:  Communication and Social Skills Development: Active Listening:  Patients were taught and increased awareness of verbal, nonverbal, and other styles and types of communication and how this impacts interactions with other people.  Patients were given an opportunity to build and practice effective communication skills to advocate and get their needs met directly.    Patient Session Goals / Objectives:    Identified their particular style of communication and how that impacts their ability to communicate with other people       Improved awareness of important aspects of communication and how this relates to mental health recovery        Established a plan for practice of these skills in their own environments    Practiced and reflected on how to generalize taught skills to their everyday life    Learn skills to promote active listening        Patient Participation / Response:  Fully participated with the group by sharing personal reflections / insights and openly received / provided feedback with other participants.    Patient Presentation: Calm,alert,focused with stable mood and thought process.    Treatment Plan:  Patient has a current master individualized treatment plan.  See Epic treatment plan for more information.    MARY KATE Mccarthy/MATEUS

## 2020-02-26 ENCOUNTER — HOSPITAL ENCOUNTER (OUTPATIENT)
Dept: BEHAVIORAL HEALTH | Facility: CLINIC | Age: 20
End: 2020-02-26
Attending: PSYCHIATRY & NEUROLOGY
Payer: COMMERCIAL

## 2020-02-26 PROCEDURE — 90853 GROUP PSYCHOTHERAPY: CPT

## 2020-02-26 PROCEDURE — G0177 OPPS/PHP; TRAIN & EDUC SERV: HCPCS

## 2020-02-26 NOTE — PROGRESS NOTES
The Adult Day Treatment Program Medical Director, Dr. Jj Moncada, has been unavailable to meet face to face during the month of February 2020 for consultation.  JASS Puga, CNP has been the on-site provider available for consultation on an as needed basis.  Dr. Jj Moncada will meet with the treatment team upon his return in March 2020 for an official psychiatry staffing review.     Katie Sevilla [facility]  Adult Day Treatment

## 2020-02-26 NOTE — GROUP NOTE
EBP Group Note    PATIENT'S NAME: Fantasma Desouza  MRN:   [id number]  :   2000  ACCT. NUMBER: [id number]  DATE OF SERVICE: 20  START TIME: 10:00 AM  END TIME: 10:50 AM  FACILITATOR: Sagrario Moncada  TOPIC:  EBP Group: Behavioral Activation  Adult Mental Health Day Treatment  TRACK: 1A    NUMBER OF PARTICIPANTS: 8    Summary of Group / Topics Discussed:  Behavioral Activation: Motivation and Procrastination: Patients explored how they currently spend their time, identifying thoughts and feelings that are motivating and serve to increase desired behaviors.  They also examined behaviors that contribute to procrastination.  Different types of procrastination behaviors were identified, and strategies to reduce individual procrastination and increase motivation were explored and practiced.  Patients identified ways to increase goal-directed activities to enhance mood and reduce symptoms.        Patient Session Goals / Objectives:    Identify current patterns of procrastination behavior and how they influence thoughts and moods, and inhibit motivation.    Identify behaviors that can be implemented that contribute to improving thoughts and feelings, motivation, and reduce symptoms.    Identify and develop a plan to increase activities that promote a sense of accomplishment and competence.    Practice scheduling positive activities / behaviors into daily routines.      Patient Participation / Response:  Fully participated with the group by sharing personal reflections / insights and openly received / provided feedback with other participants.    Demonstrated understanding of topics discussed through group discussion and participation, Expressed understanding of the relationship between behaviors, thoughts, and feelings and Shared experiences and challenges with making behavioral changes    Treatment Plan:  Patient has a current master individualized treatment plan.  See Epic treatment plan for more  information.    Sagrario Moncada

## 2020-02-26 NOTE — GROUP NOTE
Life Skills/OT Group Note    PATIENT'S NAME: Fantasma Desouza  MRN:   [id number]  :   2000  ACCT. NUMBER: [id number]  DATE OF SERVICE: 20  START TIME: 11:00 AM  END TIME: 11:50 AM  FACILITATOR: Fabio Albright OT  TOPIC:  Life Skills Group: Sensory Approaches in Mental Health  Adult Mental Health Day Treatment  TRACK: 1A    NUMBER OF PARTICIPANTS: 8    Summary of Group / Topics Discussed:  Sensory Approaches in Mental Health:  Sensory Enhanced Mindfulness: Patients were taught and provided with an opportunity to explore and practice how using sensory enhanced mindfulness practices in a group therapeutic process can help them stay grounded in the present moment as a way to manage mental health symptoms and stressors. They engaged in an experiential process that challenged them to practice listening skills and adjust their responses accordingly for improved interpersonal effectiveness. They reflected before and after on their level of nervous system activation, applying earlier taught concepts to the process.     Patient Session Goals / Objectives:    Identified how using sensory enhanced mindfulness practices can be used for grounding, stress management, and self regulation      Improved awareness of different types of sensory enhanced mindfulness activities that assist with healthy coping of stress and symptoms      Improved self awareness around effective ways to communicate with peers during experiential process.     Established a plan for practice of these skills in their own environments    Practiced and reflected on how to generalize taught skills to their everyday life        Patient Participation / Response:  Fully participated with the group by sharing personal reflections / insights and openly received / provided feedback with other participants.    Verbalized understanding of content and Patient would benefit from additional opportunities to practice the content to be able to generalize it to  their everyday life with increased intentionality, consistency, and efficacy in support of their psychiatric recovery    Treatment Plan:  Patient has a current master individualized treatment plan.  See Epic treatment plan for more information.    Fabio Albright, OT

## 2020-02-26 NOTE — GROUP NOTE
"Process Group Note    PATIENT'S NAME: Fantasma Desouza  MRN:   [id number]  :   2000  ACCT. NUMBER: [id number]  DATE OF SERVICE: 20  START TIME:  9:00 AM  END TIME:  9:50 AM  FACILITATOR: Sagrario Moncada  TOPIC:  Process Group    Diagnoses:  296.32 (F33.1) Major Depressive Disorder, Recurrent Episode, Moderate _300.00 (F41.9) Unspecified Anxiety Disorder       Adult Mental Health Day Treatment  TRACK: 1A    NUMBER OF PARTICIPANTS: 8          Data:    Session content: At the start of this group, patients were invited to check in by identifying themselves, describing their current emotional status, and identifying issues to address in this group.   Area(s) of treatment focus addressed in this session included Symptom Management, Personal Safety, Develop / Improve Independent Living Skills and Develop Socialization / Interpersonal Relationship Skills.  Fantasma reported having an uneventful last few days.  He reported training both days.  He reports mood is \"solid\" but he has ongoing feelings of being lost.  He reported being unsure of what he is doing in life but recognizes many people are in this same position.  He reported some stress when his friendship bracelet made with his girlfriend broke and got lost.  He reported seeing symbolism in this and texted her about it.  However, he reports it does not bother him as much as he thought it might and for that he is thankful.      Therapeutic Interventions/Treatment Strategies:  Psychotherapist offered support, feedback and validation and reinforced use of skills. Treatment modalities used include Motivational Interviewing, Cognitive Behavioral Therapy and Dialectical Behavioral Therapy.  Validated and normalized.  Highlighted and reinforced skills used; connected to positive outcomes.  Provided additional skill suggestions.  Identified and challenged cognitive distortions.  Aided in creating a plan to help work towards goals.      Assessment:    Patient " response:   Patient responded to session by accepting feedback, giving feedback, listening, focusing on goals, being attentive and accepting support    Possible barriers to participation / learning include: and no barriers identified    Health Issues:   None reported       Substance Use Review:   Substance Use: No active concerns identified.    Mental Status/Behavioral Observations  Appearance:   Appropriate   Eye Contact:   Fair   Psychomotor Behavior: Normal   Attitude:   Cooperative   Orientation:   All  Speech   Rate / Production: Normal    Volume:  Normal   Mood:    Anxious  Normal  Affect:    Appropriate   Thought Content:   Rumination  Thought Form:  Coherent  Logical     Insight:    Fair     Plan:     Safety Plan: No current safety concerns identified.  Recommended that patient call 911 or go to the local ED should there be a change in any of these risk factors.     Barriers to treatment: None identified    Patient Contracts (see media tab):  None    Substance Use: Not addressed in session     Continue or Discharge: Patient will continue in Adult Day Treatment (ADT)  as planned. Patient is likely to benefit from learning and using skills as they work toward the goals identified in their treatment plan.      Sagrario Moncada  February 26, 2020

## 2020-02-28 ENCOUNTER — HOSPITAL ENCOUNTER (OUTPATIENT)
Dept: BEHAVIORAL HEALTH | Facility: CLINIC | Age: 20
End: 2020-02-28
Attending: PSYCHIATRY & NEUROLOGY
Payer: COMMERCIAL

## 2020-02-28 PROCEDURE — G0177 OPPS/PHP; TRAIN & EDUC SERV: HCPCS

## 2020-02-28 PROCEDURE — 90853 GROUP PSYCHOTHERAPY: CPT

## 2020-02-28 NOTE — GROUP NOTE
Process Group Note    PATIENT'S NAME: Fantasma Desouza  MRN:   [id number]  :   2000  ACCT. NUMBER: [id number]  DATE OF SERVICE: 20  START TIME: 10:00 AM  END TIME: 10:50 AM  FACILITATOR: Sagrario Moncada  TOPIC:  Process Group    Diagnoses:  296.32 (F33.1) Major Depressive Disorder, Recurrent Episode, Moderate _300.00 (F41.9) Unspecified Anxiety Disorder       Adult Mental Health Day Treatment  TRACK: 1A    NUMBER OF PARTICIPANTS: 8          Data:    Session content: At the start of this group, patients were invited to check in by identifying themselves, describing their current emotional status, and identifying issues to address in this group.   Area(s) of treatment focus addressed in this session included Symptom Management, Personal Safety, Develop / Improve Independent Living Skills and Develop Socialization / Interpersonal Relationship Skills.  Fantasma reported elevated symptoms.  He reported feeling like he is back to [facility].  He reported feeling like he does not like what he is doing in his life.  He enjoys MMA and some of his other activities but does not feel he has purpose.  He reported feeling like he wants romantic connection but is not ready to date.  He reports wanting an answer from girlfriend/ex if they are together or not [facility] [place] [facility] [place].  He reported feeling numb.  He endorsed suicidal ideation with no plan or intent to act.  He committed to safety.      Therapeutic Interventions/Treatment Strategies:  Psychotherapist offered support, feedback and validation and reinforced use of skills. Treatment modalities used include Motivational Interviewing, Cognitive Behavioral Therapy and Dialectical Behavioral Therapy.  Validated and normalized.  Highlighted and reinforced skills used; connected to positive outcomes.  Provided additional skill suggestions.  Identified and challenged cognitive distortions.  Encouraged interpersonal skills to assert  with girlfriend/ex.  Aided in creating a plan to help work towards goals.      Assessment:    Patient response:   Patient responded to session by accepting feedback, giving feedback, listening, focusing on goals, being attentive and accepting support    Possible barriers to participation / learning include: and no barriers identified    Health Issues:   None reported       Substance Use Review:   Substance Use: No active concerns identified.    Mental Status/Behavioral Observations  Appearance:   Appropriate   Eye Contact:   Good   Psychomotor Behavior: Normal   Attitude:   Cooperative   Orientation:   All  Speech   Rate / Production: Normal    Volume:  Normal   Mood:    Anxious  Depressed   Affect:    Appropriate   Thought Content:   Rumination and Safety reports  presence of suicidal ideation passive suicidal ideation   Thought Form:  Coherent  Logical     Insight:    Fair     Plan:     Safety Plan: Committed to safety and agreed to follow previously developed safety coping plan.      Barriers to treatment: None identified    Patient Contracts (see media tab):  None    Substance Use: Not addressed in session     Continue or Discharge: Patient will continue in Adult Day Treatment (ADT)  as planned. Patient is likely to benefit from learning and using skills as they work toward the goals identified in their treatment plan.      Sagrario Moncada  February 28, 2020

## 2020-02-28 NOTE — GROUP NOTE
RN Group Note    PATIENT'S NAME: Fantasma Desouza  MRN:   [id number]  :   2000  ACCT. NUMBER: [id number]  DATE OF SERVICE: 20  START TIME:  9:00 AM  END TIME:  9:50 AM  FACILITATOR: Sandra Hammond RN  TOPIC:  RN Group: Health Maintenance  Adult Mental Health Day Treatment  TRACK: 1A    NUMBER OF PARTICIPANTS: 8    Summary of Group / Topics Discussed:  Health Maintenance: Eight Dimensions of Wellness: The concept of holistic health through the model of eight dimensions was introduced. Group members participated in identifying behaviors and activities in each of the dimensions of wellness.  The importance of each dimension was reinforced and the concept of balance in life as it relates to wellness was explored.      Patient Session Goals / Objectives:    Verbalized understanding of balance in wellness and how it relates to their life    Identified and explained the eight dimensions of wellness    Categorized activities and wellness needs into corresponding dimensions appropriately during exercise          Patient Participation / Response:  Fully participated with the group by sharing personal reflections / insights and openly received / provided feedback with other participants.    Demonstrated understanding of topics discussed through group discussion and participation and Identified / Expressed personal readiness to practice skills    Treatment Plan:  Patient has a current master individualized treatment plan.  See Epic treatment plan for more information.    Sandra Hammond, FRANTZ

## 2020-02-28 NOTE — GROUP NOTE
EBP Group Note    PATIENT'S NAME: Fantasma Desouza  MRN:   [id number]  :   2000  ACCT. NUMBER: [id number]  DATE OF SERVICE: 20  START TIME: 11:00 AM  END TIME: 11:50 AM  FACILITATOR: Sagrario Moncada  TOPIC:  EBP Group: Behavioral Activation  Adult Mental Health Day Treatment  TRACK: 2B    NUMBER OF PARTICIPANTS: 8    Summary of Group / Topics Discussed:  Behavioral Activation: [place] Ahead: {Patients identified situations that prompt unwanted and unhelpful emotions / thoughts / behaviors.   Patients discussed how to problem solve by proactively using coping skills in potentially difficult situations. Components included describing the situation, brainstorming coping skills, imagining how scenario can/will unfold, rehearsing the action plan, and practicing relaxation to follow.  Patients practiced using these skills to reduce symptom distress and increase effective coping  behaviors.      Patient Session Goals / Objectives:    Identify difficult situation(s), and gain proficiency with alternative behaviors / skills to problem solve.    Increase confidence using coping skills through group practice in session.    Receive and provide feedback regarding skill development.    Apply coping skills in daily life situations.      Patient Participation / Response:  Fully participated with the group by sharing personal reflections / insights and openly received / provided feedback with other participants.    Demonstrated understanding of topics discussed through group discussion and participation, Expressed understanding of the relationship between behaviors, thoughts, and feelings and Shared experiences and challenges with making behavioral changes    Treatment Plan:  Patient has a current master individualized treatment plan.  See Epic treatment plan for more information.    Sagrario Moncada

## 2020-03-02 ENCOUNTER — TELEPHONE (OUTPATIENT)
Dept: BEHAVIORAL HEALTH | Facility: CLINIC | Age: 20
End: 2020-03-02

## 2020-03-02 ENCOUNTER — HOSPITAL ENCOUNTER (OUTPATIENT)
Dept: BEHAVIORAL HEALTH | Facility: CLINIC | Age: 20
End: 2020-03-02
Attending: PSYCHIATRY & NEUROLOGY
Payer: COMMERCIAL

## 2020-03-02 PROCEDURE — G0177 OPPS/PHP; TRAIN & EDUC SERV: HCPCS | Performed by: OCCUPATIONAL THERAPIST

## 2020-03-02 PROCEDURE — 90853 GROUP PSYCHOTHERAPY: CPT

## 2020-03-02 NOTE — GROUP NOTE
Psychoeducation Group Note    PATIENT'S NAME: Fantasma Desouza  MRN:   [id number]  :   2000  ACCT. NUMBER: [id number]  DATE OF SERVICE: 3/02/20  START TIME:  9:00 AM  END TIME:  9:50 AM  FACILITATOR: Mindi Siegel OTR/L  TOPIC: MH Life Skills Group: Lifestyle Balance and Structure  Adult Mental Health Day Treatment  TRACK: 1A    NUMBER OF PARTICIPANTS: 7    Summary of Group / Topics Discussed:  Lifestyle Balance and Strucure:  Benefits of Leisure on Mental Health: Patients explored and learned about the benefits and possibilities of leisure activity to create lifestyle balance that supports their mental and physical wellbeing.  Patients were assisted to identify individualized leisure values and interests, recognized the benefits of leisure activity on mental health, and problem solved barriers to leisure engagement and strategies to overcome.       Patient Session Goals / Objectives:    Increased awareness of the importance of engagement in leisure activities to support lifestyle balance and perceived quality of life    Identified strategies to recognize and challenge barriers to leisure participation     Facilitated exploration of meaningful leisure interests and values    Practiced and reflected on how to generalize taught skills to their everyday life        Patient Participation / Response:  Fully participated with the group by sharing personal reflections / insights and openly received / provided feedback with other participants.    Patient presentation: congruent affect; active participant in group discussion; low but even mood, Verbalized understanding of content and Patient would benefit from additional opportunities to practice the content to be able to generalize it to their everyday life with increased intentionality, consistency, and efficacy in support of their psychiatric recovery    Treatment Plan:  Patient has a current master individualized treatment plan.  See Epic treatment plan for more  information.    Mindi Siegel, OTR/L

## 2020-03-02 NOTE — GROUP NOTE
Psychotherapy Group Note    PATIENT'S NAME: Fantasma Desouza  MRN:   [id number]  :   2000  ACCT. NUMBER: [id number]  DATE OF SERVICE: 3/02/20  START TIME: 11:00 AM  END TIME: 11:50 AM  FACILITATOR: Carmen Gann  TOPIC:  EBP Group: Emotions Management  Adult Mental Health Day Treatment  TRACK: 1A    NUMBER OF PARTICIPANTS: 5    Summary of Group / Topics Discussed:  Emotions Management: Model of Emotions: Patients were introduced to the cyclical model of emotions.  Explored emotions are shaped by different events and one s interpretation of events.  Group discussed how emotions begin with an event, followed by one s interpretation, followed by associated feelings.  Discussion included a review of personal urges and actions that can/do follow an emotional experience in the patient s life, and the end results.    Patient Session Goals / Objectives:    Demonstrate understanding of types various emotions.    Identify and discuss specific emotions and when they occur; understand triggers.    Identify individual emotions and physical sensations that accompany them.    Discuss urges and actions, and how to influence the intensity of emotional reactions and disrupt the cycle.      Discuss barriers to emotional regulation.    Choose 1-2 strategies to assist with emotional response to potentially distressing situations.      Patient Participation / Response:  Moderately participated, sharing some personal reflections / insights and adequately adequately received / provided feedback with other participants.    Expressed understanding of the relevance / importance of emotions management skills at distressing times in life and Identified / Expressed personal readiness to practice new emotions management skills    Treatment Plan:  Patient has a current master individualized treatment plan.  See Epic treatment plan for more information.    Carmen Gann

## 2020-03-02 NOTE — TELEPHONE ENCOUNTER
----- Message from Sandra Cardoza sent at 3/2/2020 10:59 AM CST -----    ----- Message -----  From: Chayito Cheung  Sent: 3/2/2020  10:38 AM CST  To: [facility]    Patient has [facility] for march  Previously had giovani bagley

## 2020-03-02 NOTE — GROUP NOTE
Process Group Note    PATIENT'S NAME: Fantasma Desouza  MRN:   [id number]  [facility]   2000  [id number]  DATE OF SERVICE: 3/02/20  START TIME: 10:00 AM  END TIME: 10:50 AM  FACILITATOR: Carmen Gann  TOPIC:  Process Group    Diagnoses:      Adult Mental Health Day Treatment  TRACK: 1A  NUMBER OF PARTICIPANTS: 7          Data:    Session content: At the start of this group, patients were invited to check in by identifying themselves, describing their current emotional status, and identifying issues to address in this group.   Area(s) of treatment focus addressed in this session included Symptom Management and Personal Safety.  Patient engaged in grounding activity and endorsed feeling ok. Patient reports having structure in his routine this weekend including attending MA class, and going to the gym. Patients goal is to focus on staying positive, barrier is getting stressed out, and skills to use is sticking to positivity (willingess). Patient is proud of going to the gym and sticking it out. The group explored negative core messages and practiced stating a neutral core belief regaridng future. The group practiced positive self affirmations at the end of group.      Therapeutic Interventions/Treatment Strategies:  Psychotherapist offered support, feedback and validation and reinforced use of skills. Interventions include Cognitive Restructuring:  Assisted patient in formulating new neutral/positive alternatives to challenge less helpful / ineffective thoughts and Coping Skills: Assisted patient in identifying 1-2 healthy distraction skills to reduce overall distress.    Assessment:    Patient response:   Patient responded to session by giving feedback, listening, focusing on goals, being attentive, accepting support and verbalizing understanding    Possible barriers to participation / learning include: and no barriers identified    Health Issues:   None reported       Substance Use Review:   Substance Use:  No active concerns identified.    Mental Status/Behavioral Observations  Appearance:   Appropriate   Eye Contact:   Fair   Psychomotor Behavior: Normal   Attitude:   Cooperative   Orientation:   All  Speech   Rate / Production: Normal    Volume:  Normal   Mood:    Normal  Affect:    Appropriate   Thought Content:   Safety denies any current safety concerns including suicidal ideation, self-harm, and homicidal ideation  Thought Form:  Coherent  Logical     Insight:    Good     Plan:     Safety Plan: No current safety concerns identified.  Recommended that patient call 911 or go to the local ED should there be a change in any of these risk factors.     Barriers to treatment: None identified    Patient Contracts (see media tab):  None    Substance Use: Not addressed in session     Continue or Discharge: Patient will continue in Adult Day Treatment (ADT)  as planned. Patient is likely to benefit from learning and using skills as they work toward the goals identified in their treatment plan.      Carmen Gann  March 2, 2020

## 2020-03-04 ENCOUNTER — HOSPITAL ENCOUNTER (OUTPATIENT)
Dept: BEHAVIORAL HEALTH | Facility: CLINIC | Age: 20
End: 2020-03-04
Attending: PSYCHIATRY & NEUROLOGY
Payer: COMMERCIAL

## 2020-03-04 PROCEDURE — 90853 GROUP PSYCHOTHERAPY: CPT

## 2020-03-04 PROCEDURE — G0177 OPPS/PHP; TRAIN & EDUC SERV: HCPCS

## 2020-03-04 NOTE — GROUP NOTE
Process Group Note    PATIENT'S NAME: Fantasma Desouza  MRN:   [id number]  :   2000  ACCT. NUMBER: [id number]  DATE OF SERVICE: 3/04/20  START TIME:  9:00 AM  END TIME:  9:50 AM  FACILITATOR: Sagrario Moncada  TOPIC:  Process Group    Diagnoses:  296.32 (F33.1) Major Depressive Disorder, Recurrent Episode, Moderate _300.00 (F41.9) Unspecified Anxiety Disorder       Adult Mental Health Day Treatment  TRACK: 1A    NUMBER OF PARTICIPANTS: 6          Data:    Session content: At the start of this group, patients were invited to check in by identifying themselves, describing their current emotional status, and identifying issues to address in this group.   Area(s) of treatment focus addressed in this session included Symptom Management, Personal Safety, Develop / Improve Independent Living Skills and Develop Socialization / Interpersonal Relationship Skills.  Fatnasma reported feeling nothing big has happened. He has some ongoing feelings of stuckness related to the situation with his girlfriend/ex but knows he is not ready to address it so he pushes it away.  He reported feeling scared to hear her say she does not care about him anymore.  HGe reported otherwise mood has been positive.      Therapeutic Interventions/Treatment Strategies:  Psychotherapist offered support, feedback and validation and reinforced use of skills. Treatment modalities used include Motivational Interviewing, Cognitive Behavioral Therapy and Dialectical Behavioral Therapy.   Validated and normalized.  Highlighted and reinforced skills used; connected to positive outcomes.  Provided additional skill suggestions.  Problem solved steps he might take to get closer to having difficult conversation with girlfriend/ex.  Aided in creating a plan to help work towards goals.        Assessment:    Patient response:   Patient responded to session by accepting feedback, giving feedback, listening, focusing on goals, being attentive and accepting  support    Possible barriers to participation / learning include: and no barriers identified    Health Issues:   None reported       Substance Use Review:   Substance Use: No active concerns identified.    Mental Status/Behavioral Observations  Appearance:   Appropriate   Eye Contact:   Poor  Psychomotor Behavior: Normal   Attitude:   Cooperative   Orientation:   All  Speech   Rate / Production: Normal    Volume:  Normal   Mood:    Depressed  Normal  Affect:    Appropriate   Thought Content:   Rumination  Thought Form:  Coherent  Logical     Insight:    Good     Plan:     Safety Plan: No current safety concerns identified.  Recommended that patient call 911 or go to the local ED should there be a change in any of these risk factors.     Barriers to treatment: None identified    Patient Contracts (see media tab):  None    Substance Use: Not addressed in session     Continue or Discharge: Patient will continue in Adult Day Treatment (ADT)  as planned. Patient is likely to benefit from learning and using skills as they work toward the goals identified in their treatment plan.      Sagrario Moncada  March 4, 2020

## 2020-03-04 NOTE — GROUP NOTE
Psychotherapy Group Note    PATIENT'S NAME: Fantasma Desouza  MRN:   [id number]  :   2000  ACCT. NUMBER: [id number]  DATE OF SERVICE: 3/04/20  START TIME: 11:00 AM  END TIME: 11:50 AM  FACILITATOR: Sagrario Moncada  TOPIC:  EBP Group: Mindfulness  Adult Mental Health Day Treatment  TRACK: 1a    NUMBER OF PARTICIPANTS: 6    Summary of Group / Topics Discussed:  Mindfulness: What is Mindfulness: Patients received an overview on what mindfulness is and how mindfulness can benefit general health, mental health symptoms, and stressors. The history of mindfulness, its application to mental health therapies, and key concepts were also discussed. Patients discussed current awareness, knowledge, and practice of mindfulness skills. Patients also discussed barriers to mindfulness practice.     Patient Session Goals / Objectives:    Demonstrated understanding of key concepts and application to daily life    Identified when/how to use mindfulness     Resolved barriers to practice    Identified plan to use mindfulness in daily life      Patient Participation / Response:  Moderately participated, sharing some personal reflections / insights and adequately adequately received / provided feedback with other participants.    Demonstrated understanding of topics discussed through group discussion and participation, Demonstrated understanding of mindfulness skills and benefits of practice and Identified / Expressed personal readiness to practice mindfulness skills    Treatment Plan:  Patient has a current master individualized treatment plan.  See Epic treatment plan for more information.    Sagrario Moncada

## 2020-03-04 NOTE — GROUP NOTE
Psychoeducation Group Note    PATIENT'S NAME: Fantasma Desouza  MRN:   [id number]  :   2000  ACCT. NUMBER: [id number]  DATE OF SERVICE: 3/04/20  START TIME: 10:00 AM  END TIME: 10:50 AM  FACILITATOR: Ann-Marie Padilla RN  TOPIC: SALIMA RN Group: Health Maintenance  Adult Mental Health Day Treatment  TRACK: 1a    NUMBER OF PARTICIPANTS: 6    Summary of Group / Topics Discussed:  Health Maintenance: Goal Setting: Meaningful goals can bring a sense of direction and purpose in life.  They also highlight our most important values. Patients were assisted by instructor to identify short term goals to promote their mental health recovery and improve overall health and wellness. Patients were educated on SMART goal setting framework as a strategy to increase outcomes and promote success. Pts made a vision board/journal to visually focus on chosen goals.    Patient Session Goals / Objectives:  ? Explained the key concepts of SMART goal setting framework  ? Identified three goals successfully using SMART goal setting framework  ? Reviewed concept of balance in wellness as it pertains to goal setting        Patient Participation / Response:  Fully participated with the group by sharing personal reflections / insights and openly received / provided feedback with other participants.    Demonstrated understanding of topics discussed through group discussion and participation    Treatment Plan:  Patient has a current master individualized treatment plan.  See Epic treatment plan for more information.    Ann-Marie Padilla RN

## 2020-03-06 ENCOUNTER — HOSPITAL ENCOUNTER (OUTPATIENT)
Dept: BEHAVIORAL HEALTH | Facility: CLINIC | Age: 20
End: 2020-03-06
Attending: PSYCHIATRY & NEUROLOGY
Payer: COMMERCIAL

## 2020-03-06 PROCEDURE — G0177 OPPS/PHP; TRAIN & EDUC SERV: HCPCS

## 2020-03-06 PROCEDURE — 90853 GROUP PSYCHOTHERAPY: CPT

## 2020-03-06 NOTE — GROUP NOTE
Psychoeducation Group Note    PATIENT'S NAME: Fantasma Desouza  MRN:   [id number]  :   2000  ACCT. NUMBER: [id number]  DATE OF SERVICE: 3/06/20  START TIME:  9:00 AM  END TIME:  9:50 AM  FACILITATOR: Sandra Hammond RN  TOPIC: MH RN Group: [facility]  Adult Mental Health Day Treatment  TRACK: 1A    NUMBER OF PARTICIPANTS: 8    Summary of Group / Topics Discussed:  Foundations of Health: Sexual health/risk reduction:   Patients were educated on safe sexual health practices including: safe use of birth control/contraceptive methods, barrier protection, and use of healthy boundaries. Major sexually transmitted infections and their signs, symptoms, transmission routes, and treatment options were discussed and prevention strategies were reviewed. Current recommendations for STI screening was discussed.     Patient Session Goals / Objectives:  ? Verbalized knowledge of safe sexual practices   ? Described sexually transmitted infections types, signs & symptoms, transmission routes and prevention strategies and behaviors  ? Identify current recommendations for STI screening        Patient Participation / Response:  Fully participated with the group by sharing personal reflections / insights and openly received / provided feedback with other participants.    Demonstrated understanding of topics discussed through group discussion and participation and Identified / Expressed personal readiness to practice skills    Treatment Plan:  Patient has a current master individualized treatment plan.  See Epic treatment plan for more information.    Sandra Hammond RN

## 2020-03-06 NOTE — GROUP NOTE
Process Group Note    PATIENT'S NAME: Fantasma Desouza  MRN:   [id number]  :   2000  ACCT. NUMBER: [id number]  DATE OF SERVICE: 3/06/20  START TIME: 10:00 AM  END TIME: 10:50 AM  FACILITATOR: Sagrario Moncada  TOPIC:  Process Group    Diagnoses:  296.32 (F33.1) Major Depressive Disorder, Recurrent Episode, Moderate _300.00 (F41.9) Unspecified Anxiety Disorder       Adult Mental Health Day Treatment  TRACK: 1A    NUMBER OF PARTICIPANTS: 8          Data:    Session content: At the start of this group, patients were invited to check in by identifying themselves, describing their current emotional status, and identifying issues to address in this group.   Area(s) of treatment focus addressed in this session included Symptom Management, Personal Safety, Develop / Improve Independent Living Skills and Develop Socialization / Interpersonal Relationship Skills.  Fantasma reported not much has happened since last group.  He reported having  amoment of anxiety when he found out he had a tattoo appointment he had schedule awhile ago.  He reported worries about having enough money.  He stated he was able to talk himself through anxiety and not get stuck in it so it only lasted about 10 minutes.  He reported some thoughts of writing to ex/girflriend as he wants to resolve their relationship by .  He agreed to start drafting a text or letter to express his feelings.      Therapeutic Interventions/Treatment Strategies:  Psychotherapist offered support, feedback and validation and reinforced use of skills. Treatment modalities used include Motivational Interviewing, Cognitive Behavioral Therapy and Dialectical Behavioral Therapy.  Validated and normalized.  Highlighted and reinforced skills used; connected to positive outcomes.  Provided additional skill suggestions.  Reflected on progress in coping with symptoms.  Encouraged use of skills to express feelings and assert needs. Aided in creating a plan to help work  towards goals.        Assessment:    Patient response:   Patient responded to session by accepting feedback, giving feedback, listening, focusing on goals, being attentive and accepting support    Possible barriers to participation / learning include: and no barriers identified    Health Issues:   None reported       Substance Use Review:   Substance Use: No active concerns identified.    Mental Status/Behavioral Observations  Appearance:   Appropriate   Eye Contact:   Fair   Psychomotor Behavior: Normal   Attitude:   Cooperative   Orientation:   All  Speech   Rate / Production: Normal    Volume:  Normal   Mood:    Anxious  Normal  Affect:    Appropriate   Thought Content:   Rumination  Thought Form:  Coherent  Logical     Insight:    Good     Plan:     Safety Plan: No current safety concerns identified.  Recommended that patient call 911 or go to the local ED should there be a change in any of these risk factors.     Barriers to treatment: None identified    Patient Contracts (see media tab):  None    Substance Use: Not addressed in session     Continue or Discharge: Patient will continue in Adult Day Treatment (ADT)  as planned. Patient is likely to benefit from learning and using skills as they work toward the goals identified in their treatment plan.      Sagrario Moncada  March 6, 2020

## 2020-03-06 NOTE — GROUP NOTE
Psychotherapy Group Note    PATIENT'S NAME: Fantasma Desouza  MRN:   [id number]  :   2000  ACCT. NUMBER: [id number]  DATE OF SERVICE: 3/06/20  START TIME: 11:00 AM  END TIME: 11:50 AM  FACILITATOR: Sagrario Moncada  TOPIC:  EBP Group: Mindfulness  Adult Mental Health Day Treatment  TRACK: 1A    NUMBER OF PARTICIPANTS: 8    Summary of Group / Topics Discussed:  Mindfulness: Mindfulness Experiential: Patients received an overview on what mindfulness is and how mindfulness can benefit general health, mental health symptoms, and stressors. The history of mindfulness, its application to mental health therapies, and key concepts were also discussed. Patients discussed current awareness, knowledge, and practice of mindfulness skills. Patients also discussed barriers to mindfulness practice.    Patient Session Goals / Objectives:    Demonstrated and verbalized understanding of key mindfulness concepts    Identified when/how to use mindfulness skills    Resolved barriers to practicing mindfulness skills    Identified plan to use mindfulness skills in daily life       Patient Participation / Response:  Fully participated with the group by sharing personal reflections / insights and openly received / provided feedback with other participants.    Demonstrated understanding of topics discussed through group discussion and participation, Demonstrated understanding of mindfulness skills and benefits of practice and Identified / Expressed personal readiness to practice mindfulness skills    Treatment Plan:  Patient has a current master individualized treatment plan.  See Epic treatment plan for more information.    Sagrario Moncada

## 2020-03-09 ENCOUNTER — HOSPITAL ENCOUNTER (OUTPATIENT)
Dept: BEHAVIORAL HEALTH | Facility: CLINIC | Age: 20
End: 2020-03-09
Attending: PSYCHIATRY & NEUROLOGY
Payer: COMMERCIAL

## 2020-03-09 PROCEDURE — G0177 OPPS/PHP; TRAIN & EDUC SERV: HCPCS

## 2020-03-09 PROCEDURE — 90853 GROUP PSYCHOTHERAPY: CPT

## 2020-03-09 NOTE — GROUP NOTE
Psychotherapy Group Note    PATIENT'S NAME: Fantasma Desouza  MRN:   [id number]  :   2000  ACCT. NUMBER: [id number]  DATE OF SERVICE: 3/09/20  START TIME: 10:00 AM  END TIME: 10:50 AM  FACILITATOR: Sagrario Moncada  TOPIC:  EBP Group: Coping Skills  Adult Mental Health Day Treatment  TRACK: 1A    NUMBER OF PARTICIPANTS: 7    Summary of Group / Topics Discussed:  Coping Skills: Radical Acceptance: Patients learned radical acceptance as a way to tolerate heightened stress in the moment.  Patients identified situations that necessitate radical acceptance.  They focused on applying acceptance of the moment to tolerate difficult emotions and events. Patients discussed how to distinguish when this can be useful in their lives and when other skills are more relevant or helpful.    Patient Session Goals / Objectives:    Understand that some amount of pain exists for each of us in our lives    Process the difficulty of acceptance in our lives and benefits of radical acceptance to mood and functioning.    Demonstrate understanding of when to use the radical acceptance to tolerate distress by providing examples of situations where this could be applied.    Identify barriers to applying radical acceptance to difficult situations and explore strategies to overcome them        Patient Participation / Response:  Minimally participated, only when prompted / asked.    Demonstrated understanding of topics discussed through group discussion and participation    Treatment Plan:  Patient has a current master individualized treatment plan.  See Epic treatment plan for more information.    Sagrario Moncada

## 2020-03-09 NOTE — GROUP NOTE
Process Group Note    PATIENT'S NAME: Fantasma Desouza  MRN:   [id number]  :   2000  ACCT. NUMBER: [id number]  DATE OF SERVICE: 3/09/20  START TIME:  9:00 AM  END TIME:  9:50 AM  FACILITATOR: Sagrario Moncada  TOPIC:  Process Group    Diagnoses:  296.32 (F33.1) Major Depressive Disorder, Recurrent Episode, Moderate _300.00 (F41.9) Unspecified Anxiety Disorder       Adult Mental Health Day Treatment  TRACK: 1A    NUMBER OF PARTICIPANTS: 7          Data:    Session content: At the start of this group, patients were invited to check in by identifying themselves, describing their current emotional status, and identifying issues to address in this group.   Area(s) of treatment focus addressed in this session included Symptom Management, Personal Safety, Develop / Improve Independent Living Skills and Develop Socialization / Interpersonal Relationship Skills.  Fantasma reported he has had a boring couple of days.  He reported seeing his ex post about spring break which triggered thoughts of how they had planned to have spring break together.  He reported this lead to increased ruminations.  He reported still wanting to write to her to get closure but is frustrated with himself as he can't think of what to say.  He reported feeling stuck and denied doing anything to cope.  He reported feeling like he is leading on people on dating apps and snapchat who he has no intention of dating.     Therapeutic Interventions/Treatment Strategies:  Psychotherapist offered support, feedback and validation and reinforced use of skills. Treatment modalities used include Motivational Interviewing, Cognitive Behavioral Therapy and Dialectical Behavioral Therapy.  Validated and normalized.  Highlighted and reinforced skills used; connected to positive outcomes.  Provided additional skill suggestions.  Identified and challenged cognitive distortions.   Aided in creating a plan to help work towards goals.      Assessment:    Patient  response:   Patient responded to session by accepting feedback, giving feedback, listening, focusing on goals, being attentive and accepting support    Possible barriers to participation / learning include: and no barriers identified    Health Issues:   None reported       Substance Use Review:   Substance Use: No active concerns identified.    Mental Status/Behavioral Observations  Appearance:   Appropriate   Eye Contact:   Fair   Psychomotor Behavior: Normal   Attitude:   Cooperative   Orientation:   All  Speech   Rate / Production: Normal    Volume:  Normal   Mood:    Depressed  Normal  Affect:    Appropriate   Thought Content:   Rumination  Thought Form:  Coherent  Logical     Insight:    Fair     Plan:     Safety Plan: No current safety concerns identified.  Recommended that patient call 911 or go to the local ED should there be a change in any of these risk factors.     Barriers to treatment: None identified    Patient Contracts (see media tab):  None    Substance Use: Not addressed in session     Continue or Discharge: Patient will continue in Adult Day Treatment (ADT)  as planned. Patient is likely to benefit from learning and using skills as they work toward the goals identified in their treatment plan.      Sagrario Moncada  March 9, 2020

## 2020-03-09 NOTE — GROUP NOTE
Psychoeducation Group Note    PATIENT'S NAME: Fantasma Desouza  MRN:   [id number]  :   2000  ACCT. NUMBER: [id number]  DATE OF SERVICE: 3/09/20  START TIME: 11:00 AM  END TIME: 11:50 AM  FACILITATOR: Lawrence Maher OTR/L  TOPIC: MH Life Skills Group: Resiliency Development  Adult Mental Health Day Treatment  TRACK: 1A    NUMBER OF PARTICIPANTS: 7    Summary of Group / Topics Discussed:  Resiliency Development:  Self Awareness and Self Expression(Sense of Self): Patients were provided with an experiential opportunity to reflect and express their important values, skills and interests, roles, relationships as they worked on strengthening their self identity.  Patients built awareness of important aspects of themselves that they want to share with others as a way to build resiliency skill and focus on good things in their lives.     Patient Session Goals / Objectives:    Identified personal values, skills and interests, roles, and relationships that contribute their self identity     Improved awareness of important aspects of themselves and how this relates to mental health recovery and contributes to resiliency development       Established a plan for practice of these skills in their own environments    Practiced and reflected on how to generalize taught skills to their everyday life    Increased awareness of self-esteem,self-confidence and self-concept        Patient Participation / Response:  Fully participated with the group by sharing personal reflections / insights and openly received / provided feedback with other participants.    Patient Presentation: Calm,alert,focused with stable mood and thought process. Patient completed a post-discharge mental health recovery scale receiving a score of 19/30 placing this individual in stage 3/4 recovery. Pre-admission score was 9/30 on 19. Scheduled tentative discharge for this patient is on 3/27/20. Discharge plan includes continued individual therapy and  young adult [facility] support groups.    Treatment Plan:  Patient has a current master individualized treatment plan.  See Epic treatment plan for more information.    Lawrence Maher, OTR/L

## 2020-03-11 ENCOUNTER — HOSPITAL ENCOUNTER (OUTPATIENT)
Dept: BEHAVIORAL HEALTH | Facility: CLINIC | Age: 20
End: 2020-03-11
Attending: PSYCHIATRY & NEUROLOGY
Payer: COMMERCIAL

## 2020-03-11 PROCEDURE — 90853 GROUP PSYCHOTHERAPY: CPT

## 2020-03-11 PROCEDURE — G0177 OPPS/PHP; TRAIN & EDUC SERV: HCPCS

## 2020-03-11 NOTE — PROGRESS NOTES
Past Medical History:   Diagnosis Date     Depressive disorder        Current Outpatient Medications:      mirtazapine (REMERON) 15 MG tablet, Take 15 mg by mouth At Bedtime, Disp: , Rfl:   Psychiatry staffing: case discussed  Diagnosis:MDD, anxiety, improved.  Relationship issues.

## 2020-03-11 NOTE — GROUP NOTE
"Process Group Note    PATIENT'S NAME: Fantasma Desouza  MRN:   [id number]  :   2000  ACCT. NUMBER: [id number]  DATE OF SERVICE: 3/11/20  START TIME:  9:00 AM  END TIME:  9:50 AM  FACILITATOR: Sagrario Moncada  TOPIC:  Process Group    Diagnoses:  296.32 (F33.1) Major Depressive Disorder, Recurrent Episode, Moderate _300.00 (F41.9) Unspecified Anxiety Disorder       Adult Mental Health Day Treatment  TRACK: 1A    NUMBER OF PARTICIPANTS: 8          Data:    Session content: At the start of this group, patients were invited to check in by identifying themselves, describing their current emotional status, and identifying issues to address in this group.   Area(s) of treatment focus addressed in this session included Symptom Management, Personal Safety, Develop / Improve Independent Living Skills and Develop Socialization / Interpersonal Relationship Skills.  Fantasma reported some increase in ruminations but denied feelings of depression.  He reported just feeling off and feeling bored with his routine.  He reported having some existential thought sof \"why bother doing this\" but denied any suicidal ideation.  He reported holding himself to perfectionistic standards with workouts but realized this and challenged thoughts.  He reported trying to brainstorm how he can better take credit for his progress.      Therapeutic Interventions/Treatment Strategies:  Psychotherapist offered support, feedback and validation and reinforced use of skills. Treatment modalities used include Motivational Interviewing, Cognitive Behavioral Therapy and Dialectical Behavioral Therapy.Validated and normalized.  Highlighted and reinforced skills used; connected to positive outcomes.  Provided additional skill suggestions.  Reflected progress in coping with ruminations.  Encouraged balance between routine and spontaneity.  Aided in creating a plan to help work towards goals.      Assessment:    Patient response:   Patient responded to " session by accepting feedback, giving feedback, listening, focusing on goals, being attentive and accepting support    Possible barriers to participation / learning include: and no barriers identified    Health Issues:   None reported       Substance Use Review:   Substance Use: No active concerns identified.    Mental Status/Behavioral Observations  Appearance:   Appropriate   Eye Contact:   Fair   Psychomotor Behavior: Normal   Attitude:   Cooperative   Orientation:   All  Speech   Rate / Production: Normal    Volume:  Normal   Mood:    Anxious  Normal  Affect:    Appropriate   Thought Content:   Clear  Thought Form:  Coherent  Logical     Insight:    Fair     Plan:     Safety Plan: No current safety concerns identified.  Recommended that patient call 911 or go to the local ED should there be a change in any of these risk factors.     Barriers to treatment: None identified    Patient Contracts (see media tab):  None    Substance Use: Facilitated behavior chain analysis    Not addressed in session     Continue or Discharge: Patient will continue in Adult Day Treatment (ADT)  as planned. Patient is likely to benefit from learning and using skills as they work toward the goals identified in their treatment plan.      Sagrario Moncada  March 11, 2020

## 2020-03-11 NOTE — GROUP NOTE
Psychotherapy Group Note    PATIENT'S NAME: Fantasma Desouza  MRN:   [id number]  :   2000  ACCT. NUMBER: [id number]  DATE OF SERVICE: 3/11/20  START TIME: 10:00 AM  END TIME: 10:50 AM  FACILITATOR: Sagrario Moncada  TOPIC:  EBP Group: Coping Skills  Adult Mental Health Day Treatment  TRACK: 1A    NUMBER OF PARTICIPANTS: 8    Summary of Group / Topics Discussed:  Coping Skills: Improve the Moment: Patients learned to tolerate distress, by applying strategies to effect positive change in the present moment.  Patients will identified situations where they would benefit from applying strategies to improve the moment and reduce distress. Patients discussed how to distinguish when this can be useful in their lives or when other strategies would be more relevant or helpful.    Patient Session Goals / Objectives:    Discuss how the use of intentional  in the moment  actions can help reduce distress.    Review patients current practices and discuss a more formal way of practicing and accessing skills.    Increase ability to decide when to use improve the moment strategies    Choose 1-2 in the moment actions to apply during times of distress.        Patient Participation / Response:  Fully participated with the group by sharing personal reflections / insights and openly received / provided feedback with other participants.    Demonstrated understanding of topics discussed through group discussion and participation, Expressed understanding of the relevance / importance of coping skills at distressing times in life and Demonstrated knowledge of when to consider using a variety of coping skills in daily life    Treatment Plan:  Patient has a current master individualized treatment plan.  See Epic treatment plan for more information.    Sagrario Moncada

## 2020-03-11 NOTE — GROUP NOTE
Psychoeducation Group Note    PATIENT'S NAME: Fantasma Desouza  MRN:   [id number]  :   2000  ACCT. NUMBER: [id number]  DATE OF SERVICE: 3/11/20  START TIME: 11:00 AM  END TIME: 11:50 AM  FACILITATOR: Fabio Albright OT  TOPIC: MH Life Skills Group: Sensory Approaches in Mental Health  Adult Mental Health Day Treatment  TRACK: 1A    NUMBER OF PARTICIPANTS: 8    Summary of Group / Topics Discussed:  Sensory Approaches in Mental Health:  Sensory Enhanced Mindfulness: Patients were taught and provided with an opportunity to explore and practice how using sensory enhanced mindfulness practices can help them stay grounded in the present moment as a way to manage mental health symptoms and stressors.         Patient Session Goals / Objectives:    Identified how using sensory enhanced mindfulness practices can be used for grounding, stress management, and self regulation      Improved awareness of different types of sensory enhanced mindfulness activities that assist with healthy coping of stress and symptoms      Established a plan for practice of these skills in their own environments    Practiced and reflected on how to generalize taught skills to their everyday life        Patient Participation / Response:  Fully participated with the group by sharing personal reflections / insights and openly received / provided feedback with other participants.    Verbalized understanding of content and Patient would benefit from additional opportunities to practice the content to be able to generalize it to their everyday life with increased intentionality, consistency, and efficacy in support of their psychiatric recovery    Treatment Plan:  Patient has a current master individualized treatment plan.  See Epic treatment plan for more information.    Fabio Albright OT

## 2020-03-13 ENCOUNTER — HOSPITAL ENCOUNTER (OUTPATIENT)
Dept: BEHAVIORAL HEALTH | Facility: CLINIC | Age: 20
End: 2020-03-13
Attending: PSYCHIATRY & NEUROLOGY
Payer: COMMERCIAL

## 2020-03-13 PROCEDURE — G0177 OPPS/PHP; TRAIN & EDUC SERV: HCPCS

## 2020-03-13 PROCEDURE — 90853 GROUP PSYCHOTHERAPY: CPT

## 2020-03-13 NOTE — GROUP NOTE
Psychotherapy Group Note    PATIENT'S NAME: Fantasma Desouza  MRN:   [id number]  :   2000  ACCT. NUMBER: [id number]  DATE OF SERVICE: 3/13/20  START TIME: 11:00 AM  END TIME: 11:50 AM  FACILITATOR: Sagrario Moncada  TOPIC:  EBP Group: Coping Skills  Adult Mental Health Day Treatment  TRACK: 1A    NUMBER OF PARTICIPANTS: 6    Summary of Group / Topics Discussed:  Coping Skills: Building Positive Experiences: Patients discussed the importance of planning and engaging in positive experiences, as strategies to increase positive thinking, hope, and self-worth.  Explored the benefits of planning / creating positive experiences, including recognizing and reducing negativity bias by focusing on and building positive experiences.   Several approaches to building positive experiences were presented and discussed relevant to each patient.      Patient Session Goals / Objectives:    Understand the purpose of planning / creating / participating / sharing in positive experiences.    Explore patient s experiences related to negative thinking and how it influences activities and moodIdentify current positive events in patient s life.     Set goals to increase a variety of positive experiences.    Address barriers to planning / engaging in positive experiences.        Patient Participation / Response:  Fully participated with the group by sharing personal reflections / insights and openly received / provided feedback with other participants.    Demonstrated understanding of topics discussed through group discussion and participation, Expressed understanding of the relevance / importance of coping skills at distressing times in life and Demonstrated knowledge of when to consider using a variety of coping skills in daily life    Treatment Plan:  Patient has a current master individualized treatment plan.  See Epic treatment plan for more information.    Sagrario Moncada

## 2020-03-13 NOTE — GROUP NOTE
Process Group Note    PATIENT'S NAME: Fantasma Desouza  MRN:   [id number]  :   2000  ACCT. NUMBER: [id number]  DATE OF SERVICE: 3/13/20  START TIME: 10:00 AM  END TIME: 10:50 AM  FACILITATOR: Sagrario Moncada  TOPIC:  Process Group    Diagnoses:  296.32 (F33.1) Major Depressive Disorder, Recurrent Episode, Moderate _300.00 (F41.9) Unspecified Anxiety Disorder       Adult Mental Health Day Treatment  TRACK: 1A    NUMBER OF PARTICIPANTS: 6          Data:    Session content: At the start of this group, patients were invited to check in by identifying themselves, describing their current emotional status, and identifying issues to address in this group.   Area(s) of treatment focus addressed in this session included Symptom Management, Personal Safety, Develop / Improve Independent Living Skills and Develop Socialization / Interpersonal Relationship Skills.  Fantasma reported feeling mood is low.  He reported feeling like why bother trying to cope or make things better.  He reported elevated suicidal ideation with no plan or intent to act.  He was able to commit to safety.  He reported talking to different girls on dating apps and feels he is doing it out of desperation as he recognizes he is not ready for a relationship.  He reports liking the attention and validation.  He feels he is the black sheep in his family.  He is working on trying to focus on positive experiences like his training.     Therapeutic Interventions/Treatment Strategies:  Psychotherapist offered support, feedback and validation and reinforced use of skills. Treatment modalities used include Motivational Interviewing, Cognitive Behavioral Therapy and Dialectical Behavioral Therapy. Validated and normalized.  Highlighted and reinforced skills used; connected to positive outcomes.  Provided additional skill suggestions.  Aided in creating a plan to help work towards goals.        Assessment:    Patient response:   Patient responded to session by  accepting feedback, giving feedback, listening, focusing on goals, being attentive and accepting support    Possible barriers to participation / learning include: and no barriers identified    Health Issues:   None reported       Substance Use Review:   Substance Use: No active concerns identified.    Mental Status/Behavioral Observations  Appearance:   Appropriate   Eye Contact:   Fair   Psychomotor Behavior: Normal   Attitude:   Cooperative   Orientation:   All  Speech   Rate / Production: Normal    Volume:  Normal   Mood:    Anxious  Depressed   Affect:    Appropriate   Thought Content:   Rumination and Safety reports  presence of suicidal ideation passive suicidal ideation   Thought Form:  Coherent  Logical     Insight:    Fair     Plan:     Safety Plan: Committed to safety and agreed to follow previously developed safety coping plan.      Barriers to treatment: None identified    Patient Contracts (see media tab):  None    Substance Use: Not addressed in session     Continue or Discharge: Patient will continue in Adult Day Treatment (ADT)  as planned. Patient is likely to benefit from learning and using skills as they work toward the goals identified in their treatment plan.      Sagrario Moncada  March 13, 2020

## 2020-03-13 NOTE — GROUP NOTE
Psychoeducation Group Note    PATIENT'S NAME: Fantasma Desouza  MRN:   [id number]  :   2000  ACCT. NUMBER: [id number]  DATE OF SERVICE: 3/13/20  START TIME:  9:00 AM  END TIME:  9:50 AM  FACILITATOR: Sandra Hammond RN  TOPIC: MH RN Group: [facility]  Adult Mental Health Day Treatment  TRACK: 1A    NUMBER OF PARTICIPANTS: 6    Summary of Group / Topics Discussed:  Foundations of Health: Nutrition: Macronutrients: Patient were provided education on major macronutrients, their role in the body, and why it is important to meet daily nutritional needs. Obstacles to meeting daily nutritional needs were identified in group discussion and strategies to promote improved nutrition were explored. Macronutrients discussed include: carbohydrates, proteins and amino acids, fats, fiber, and water.     Patient Session Goals / Objectives:  ? Discussed the role of diet on mood, physical health, energy level, and the development of chronic disease.  ? Identified daily nutritional needs recommended by the [employer] via My [employer] education resources  ? Developing increased health literacy skills in finding credible nutrition information from reliable sources        Patient Participation / Response:  Fully participated with the group by sharing personal reflections / insights and openly received / provided feedback with other participants.    Demonstrated understanding of topics discussed through group discussion and participation and Identified / Expressed personal readiness to practice skills    Treatment Plan:  Patient has a current master individualized treatment plan.  See Epic treatment plan for more information.    Sandra Hammond RN

## 2020-03-16 ENCOUNTER — HOSPITAL ENCOUNTER (OUTPATIENT)
Dept: BEHAVIORAL HEALTH | Facility: CLINIC | Age: 20
End: 2020-03-16
Attending: PSYCHIATRY & NEUROLOGY
Payer: COMMERCIAL

## 2020-03-16 PROCEDURE — 90853 GROUP PSYCHOTHERAPY: CPT

## 2020-03-16 PROCEDURE — G0177 OPPS/PHP; TRAIN & EDUC SERV: HCPCS

## 2020-03-16 NOTE — GROUP NOTE
Psychotherapy Group Note    PATIENT'S NAME: Fantasma Desouza  MRN:   [id number]  :   2000  ACCT. NUMBER: [id number]  DATE OF SERVICE: 3/16/20  START TIME: 10:00 AM  END TIME: 10:50 AM  FACILITATOR: Sagrario Moncada  TOPIC:  EBP Group: Coping Skills  Adult Mental Health Day Treatment  TRACK: 1A    NUMBER OF PARTICIPANTS: 6    Summary of Group / Topics Discussed:  Coping Skills: Grounding: Patients discussed and practiced strategies to increase attachment / presence to the current moment.  Patients identified situations in which using these strategies will help improve emotion regulation sense of calm in the body.  Reviewed the benefits of applying grounding strategies, as well as past / current practices of each member.  Patients identified situations in which using these strategies would reduce stress. They developed the ability to distinguish when these strategies can be useful in their lives for management and stress and psychological well-being.    Patient Session Goals / Objectives:    Understand the purpose of using grounding strategies to reduce stress.    Verbalize understanding of how and when to apply grounding strategies to reduce distress and increase presence in the moment.    Review patients current grounding practices and discuss a more formal way of practicing and accessing skills.    Practice using various calming strategies (e.g. 5-4-3-2-1; mental and body awareness).    Choose 1-2 grounding strategies to apply during times of distress.        Patient Participation / Response:  Fully participated with the group by sharing personal reflections / insights and openly received / provided feedback with other participants.    Demonstrated understanding of topics discussed through group discussion and participation, Expressed understanding of the relevance / importance of coping skills at distressing times in life and Demonstrated knowledge of when to consider using a variety of coping skills in  daily life    Treatment Plan:  Patient has a current master individualized treatment plan.  See Epic treatment plan for more information.    Sagrario Moncada

## 2020-03-16 NOTE — GROUP NOTE
Psychoeducation Group Note    PATIENT'S NAME: Fantasma Desouza  MRN:   [id number]  :   2000  ACCT. NUMBER: [id number]  DATE OF SERVICE: 3/16/20  START TIME: 11:00 AM  END TIME: 11:50 AM  FACILITATOR: Lawrence Maher OTR/L  TOPIC: MH Life Skills Group: Cognitive Functioning  Adult Mental Health Day Treatment  TRACK: 1A    NUMBER OF PARTICIPANTS: 6    Summary of Group / Topics Discussed:  Cognitive Functioning Executive Skills(Self-Assessment): Patients were taught and provided with an opportunity to gain awareness of how their mental health symptoms impact their current cognitive functioning as well as how this impacts their performance and participation in meaningful roles, relationships, and routines.  Patients were taught skills and strategies on how to monitor and improve cognitive performance through remediation or compensatory strategies.  Patients were given opportunities to practice taught skills and techniques in session and how to apply to everyday life.        Patient Session Goals / Objectives:    Identified how their mental health symptoms impact their functioning, focusing on specific cognitive challenges     Improved awareness of specific remediation and/or compensatory strategies to improve  executive functioning skills and how this relates to mental health recovery        Established a plan for practice of these skills in their own environments    Practiced and reflected on how to generalize taught skills to their everyday life           Patient Participation / Response:  Fully participated with the group by sharing personal reflections / insights and openly received / provided feedback with other participants.    Patient Presentation: Calm,alert,focused with stable mood and thought process.    Treatment Plan:  Patient has a current master individualized treatment plan.  See Epic treatment plan for more information.    MARY KATE Mccarthy/MATEUS

## 2020-03-16 NOTE — GROUP NOTE
Process Group Note    PATIENT'S NAME: Fantasma Desouza  MRN:   [id number]  :   2000  ACCT. NUMBER: [id number]  DATE OF SERVICE: 3/16/20  START TIME:  9:00 AM  END TIME:  9:50 AM  FACILITATOR: Sagrario Moncada  TOPIC:  Process Group    Diagnoses:  296.32 (F33.1) Major Depressive Disorder, Recurrent Episode, Moderate _300.00 (F41.9) Unspecified Anxiety Disorder       Adult Mental Health Day Treatment  TRACK: 1A    NUMBER OF PARTICIPANTS: 6          Data:    Session content: At the start of this group, patients were invited to check in by identifying themselves, describing their current emotional status, and identifying issues to address in this group.   Area(s) of treatment focus addressed in this session included Symptom Management, Personal Safety, Develop / Improve Independent Living Skills and Develop Socialization / Interpersonal Relationship Skills.  Fantasma reported having a good couple of days.  He reported spending time with friends helped him to break away from depressive mood.  He reported continuing to plan time with friends.  He plans to use mood momentum to try to get past the boredom of isolation.  He plans to engage in relaxation activities.        Therapeutic Interventions/Treatment Strategies:  Psychotherapist offered support, feedback and validation and reinforced use of skills. Treatment modalities used include Motivational Interviewing, Cognitive Behavioral Therapy and Dialectical Behavioral Therapy.  Validated and normalized.  Highlighted and reinforced skills used; connected to positive outcomes.  Provided additional skill suggestions.  Aided in creating a plan to help work towards goals.      Assessment:    Patient response:   Patient responded to session by accepting feedback, giving feedback, listening, focusing on goals, being attentive and accepting support    Possible barriers to participation / learning include: and no barriers identified    Health Issues:   None reported        Substance Use Review:   Substance Use: No active concerns identified.    Mental Status/Behavioral Observations  Appearance:   Appropriate   Eye Contact:   Good   Psychomotor Behavior: Normal   Attitude:   Cooperative   Orientation:   All  Speech   Rate / Production: Normal    Volume:  Normal   Mood:    Normal  Affect:    Appropriate   Thought Content:   Clear  Thought Form:  Coherent  Logical     Insight:    Fair     Plan:     Safety Plan: No current safety concerns identified.  Recommended that patient call 911 or go to the local ED should there be a change in any of these risk factors.     Barriers to treatment: None identified    Patient Contracts (see media tab):  None    Substance Use: Not addressed in session     Continue or Discharge: Patient will continue in Adult Day Treatment (ADT)  as planned. Patient is likely to benefit from learning and using skills as they work toward the goals identified in their treatment plan.      Sagrario Moncada  March 16, 2020

## 2020-03-17 ENCOUNTER — TELEPHONE (OUTPATIENT)
Dept: BEHAVIORAL HEALTH | Facility: CLINIC | Age: 20
End: 2020-03-17

## 2020-03-17 NOTE — TELEPHONE ENCOUNTER
Left voicemail for Client.  Stated attempt to contact is to update Client on plan for the rest of the week regarding programming and to gather information about what kind of programming might fit Client's abilities.  Requested a return phone call to writer's direct number.

## 2020-03-18 ENCOUNTER — HOSPITAL ENCOUNTER (OUTPATIENT)
Dept: BEHAVIORAL HEALTH | Facility: CLINIC | Age: 20
End: 2020-03-18
Attending: PSYCHIATRY & NEUROLOGY
Payer: COMMERCIAL

## 2020-03-18 NOTE — TELEPHONE ENCOUNTER
Called Patient today to discuss the program specifics related to the COVID-19 interim plan and to review the following:  Start time: 12:21  End time: 12:32      Check-in (re: any symptoms, concerns, safety, etc)  Fantasma reported mood is good.  He was offered two weeks off of work and planned to take advantage of that time to focus on relaxation.  He is trying to make sure he is relaxing but not bored.  He has plans to contact a friend and spend some time outside.  He reported maintaining positive mood after spike in symptoms last week.  Reflected positive coping techniques and decrease in frequency, intensity, and duration of symptoms.        Ask [place] Questions (2)  1. Have you wished you were dead or wished you could go to sleep and not wake up? No  2. Have you actually had any thoughts of killing yourself? No  Proceed onto additional questions if needed.       Do you have a copy of your Safety Plan?  Yes     Was Patient provided with crisis phone numbers? Yes      Do you have a phone?  # to reach you? [phone] Y/N  Checked to make sure the phone number listed is accurate and reminded Patient to check and clear voicemail messages.  o Is it a smart phone? Yes  o Do you have limitations on minutes or data that might prevent use? No  o Do you have a laptop or computer with a web cam? I think so,   o Do you have Wifi or sufficient cellular data? Yes  o What other services or supports do you have in place that you still have access to?  - Psychiatrist? Yes- no scheduled appt, as needed  - Individual Therapist? Yes- as needed, nothing right now  - Supportive Living Situation? Yes  - ? NO  - ARM worker? No  - Other? No- Reminded of [facility] support groups.          Patient was informed of the following information:     Informed patient that we are no longer providing services on site due to COVID-19 until further notice.  Instructed them to not enter the facility unless they are in need of emergency  services (Go to ED).    Reassured them that they are important to us and we will do whatever we can to continue to provide service and support to them during this time.     o We will contact you again tomorrow to check in and update you on our progress. If you have any concerns please call the program.  o If you need more immediate assistance, please go to nearest emergency department or call crisis phone number.    o If you have COVID-19 concerns, please contact [email or web address] or call [phone]

## 2020-03-20 ENCOUNTER — HOSPITAL ENCOUNTER (OUTPATIENT)
Dept: BEHAVIORAL HEALTH | Facility: CLINIC | Age: 20
End: 2020-03-20
Attending: PSYCHIATRY & NEUROLOGY
Payer: COMMERCIAL

## 2020-03-20 NOTE — PROGRESS NOTES
"Fantasma Desouza is a 19 year old male who is being evaluated via a billable telephone visit.      The patient has been notified of following:     \"This telephone visit will be conducted via a call between you and your clinican. We have found that certain health care needs can be provided without the need for a physical exam.  This service lets us provide the care you need with a phone conversation.\"     AMARIS Meek reported mood is good.  He reported enjoying the freedom he has right now with no work or group.  He used this time to reorganize his room and noted feelings of happiness and productivity related to this. He denied getting stuck in boredom and ruminations. He has been staying in social contact with friends and making plans to hang out in a small group.  He reported no safety concerns at this time.  He would ideally like to have a discharge day in person so would like to push discharge back but also recognizes he may not need services much longer and asked for flexibility in his discharge date.     I. Validated and normalized.  Highlighted and reinforced skills used; connected to positive outcomes.  Provided additional skill suggestions.  Reflected on progress in coping with downtime and not getting stuck in ruminations.  Aided in creating a plan to help work towards goals. Taught on psychoeducation skill of gratitude.   Encouraged him to check out [employer] support groups.      REGULO Meek appears upbeat and positive.  He appeared receptive to feedback and willing to try skill suggestions.    THOMAS Meek will maintain safety.  He will engage in activities to maintain mood.    Assessment/Plan:    296.32 (F33.1) Major Depressive Disorder, Recurrent Episode, Moderate _300.00 (F41.9) Unspecified Anxiety Disorder       I have reviewed the note as documented above.  This accurately captures the substance of my conversation with the patient.      Phone call contact time  Call Started at 11:20  Call Ended at " 11:26    Sagrario Moncada

## 2020-03-23 ENCOUNTER — TELEPHONE (OUTPATIENT)
Dept: BEHAVIORAL HEALTH | Facility: CLINIC | Age: 20
End: 2020-03-23

## 2020-03-23 NOTE — TELEPHONE ENCOUNTER
Left voicemail at 10:16 am.  Stated plan to check in with symptoms, safety, and stressors as well as provide updates on plan for therapy.  Requested a return phone call.

## 2020-03-25 ENCOUNTER — TELEPHONE (OUTPATIENT)
Dept: BEHAVIORAL HEALTH | Facility: CLINIC | Age: 20
End: 2020-03-25

## 2020-03-25 ENCOUNTER — HOSPITAL ENCOUNTER (OUTPATIENT)
Dept: BEHAVIORAL HEALTH | Facility: CLINIC | Age: 20
End: 2020-03-25
Attending: PSYCHIATRY & NEUROLOGY
Payer: COMMERCIAL

## 2020-03-25 NOTE — TELEPHONE ENCOUNTER
Left voicemail at 10:54 am.  Stated intent to check in with symptom, stressors, and safety as well as help create a plan to build structure and meet goals.  Stated hope to test video call with Client later today.  Requested return phone call.

## 2020-03-27 ENCOUNTER — HOSPITAL ENCOUNTER (OUTPATIENT)
Dept: BEHAVIORAL HEALTH | Facility: CLINIC | Age: 20
End: 2020-03-27
Attending: PSYCHIATRY & NEUROLOGY
Payer: COMMERCIAL

## 2020-03-27 ENCOUNTER — TELEPHONE (OUTPATIENT)
Dept: BEHAVIORAL HEALTH | Facility: CLINIC | Age: 20
End: 2020-03-27

## 2020-03-27 NOTE — PROGRESS NOTES
Adult Mental Health Day Treatment  TRACK: 1A    PATIENT'S NAME: Fantasma Desouza  MRN:   [id number]  :   2000  ACCT. NUMBER: [id number]  DATE OF SERVICE: 3/27/20  START TIME: 3:00 pm  END TIME: 3:06    Telemedicine Visit: The patient's condition can be safely assessed and treated via synchronous audio and visual telemedicine encounter.      Reason for Telemedicine Visit: COVID 19    Originating Site (Patient Location): Patient's other In the car    Distant Site (Provider Location): [facility]: [facility]    Consent:  The patient/guardian has verbally consented to: the potential risks and benefits of telemedicine (video visit) versus in person care; bill my insurance or make self-payment for services provided; and responsibility for payment of non-covered services.     Mode of Communication:  Video Conference via [employer]    As the provider I attest to compliance with applicable laws and regulations related to telemedicine.      NUMBER OF PARTICIPANTS: 1    Diagnoses:  296.32 (F33.1) Major Depressive Disorder, Recurrent Episode, Moderate _300.00 (F41.9) Unspecified Anxiety Disorder       Data:    Session content: At the start of this group, patients were invited to check in by identifying themselves, describing their current emotional status, and identifying issues to address in this group.   Area(s) of treatment focus addressed in this session included Symptom Management, Personal Safety, Community Resources/Discharge Planning, Develop / Improve Independent Living Skills and Develop Socialization / Interpersonal Relationship Skills.  Fantasma denied safety concerns.  He reported mood was positive.  He reported sticking to his routine and staying organized.  He reported wanting to wait to discharge at least a week to see if he can get feedback from peers in group telehealth.      Therapeutic Interventions/Treatment Strategies:  Psychotherapist offered support, feedback and validation and  reinforced use of skills. Treatment modalities used include Motivational Interviewing, Cognitive Behavioral Therapy and Dialectical Behavioral Therapy.  Validated and normalized.  Highlighted and reinforced skills used; connected to positive outcomes.  Provided additional skill suggestions.  Aided in creating a plan to help work towards goals.      Assessment:    Patient response:   Patient responded to session by accepting feedback, listening, focusing on goals, being attentive and accepting support    Possible barriers to participation / learning include: and no barriers identified    Health Issues:   None reported       Substance Use Review:   Substance Use: No active concerns identified.    Mental Status/Behavioral Observations  Appearance:   Appropriate   Eye Contact:   Good   Psychomotor Behavior: Normal   Attitude:   Cooperative   Orientation:   All  Speech   Rate / Production: Normal    Volume:  Normal   Mood:    Normal  Affect:    Appropriate   Thought Content:   Clear  Thought Form:  Coherent  Logical     Insight:    Good     Plan:     Safety Plan: No current safety concerns identified.  Recommended that patient call 911 or go to the local ED should there be a change in any of these risk factors.     Barriers to treatment: None identified    Patient Contracts (see media tab):  None    Substance Use: Not addressed in session     Continue or Discharge: Patient will continue in Adult Day Treatment (ADT)  as planned. Patient is likely to benefit from learning and using skills as they work toward the goals identified in their treatment plan.      Sagrario Moncada  March 27, 2020

## 2020-03-27 NOTE — TELEPHONE ENCOUNTER
Left voicemail at 9:43.  Stated plan to do checkin via video chat.  Asked what a good time to do this would be at and explained to options to test it (phone and computer) as well as what steps to take to make this work.  Reflected that this is Clients discharge date and that he could choose to push it back or discharge.  Requested a return phone call.

## 2020-03-30 ENCOUNTER — HOSPITAL ENCOUNTER (OUTPATIENT)
Dept: BEHAVIORAL HEALTH | Facility: CLINIC | Age: 20
End: 2020-03-30
Attending: PSYCHIATRY & NEUROLOGY
Payer: COMMERCIAL

## 2020-03-30 NOTE — PROGRESS NOTES
"Adult Mental Health Day Treatment  TRACK: 1A    PATIENT'S NAME: Fantasma Desouza  MRN:   [id number]  :   2000  ACCT. NUMBER: [id number]  DATE OF SERVICE: 3/30/20  START TIME: 1:00 pm  END TIME: 1:30 pm    Fantasma Desouza is a 19 year old male who is being evaluated via a telephone visit.      The patient has been notified of the following:     \"We have found that certain health care needs can be provided without the need for a face to face visit.  This service lets us provide the care you need with a short phone conversation.      I will have full access to your [facility] medical record during this entire phone call.   I will be taking notes for your medical record.     Since this is like an office visit, we will bill your insurance company for this service.      There are potential benefits and risks of telephone visits (e.g. limits to patient confidentiality) that differ from in-person visits. Confidentiality still applies for telephone services, and nobody will record the visit.  It is important to be in a quiet, private space that is free of distractions (including cell phone or other devices) during the visit.     If during the course of the call I believe a telephone visit is not appropriate, you will not be charged for this service\"    Consent has been obtained for this service by care team member: yes.        NUMBER OF PARTICIPANTS: 1    Diagnoses:  296.32 (F33.1) Major Depressive Disorder, Recurrent Episode, Moderate _300.00 (F41.9) Unspecified Anxiety Disorder       Data:    Session content: At the start of this group, patients were invited to check in by identifying themselves, describing their current emotional status, and identifying issues to address in this group.   Area(s) of treatment focus addressed in this session included Symptom Management, Personal Safety, Develop / Improve Independent Living Skills and Develop Socialization / Interpersonal Relationship Skills.  Client reported mood is good but " he is starting to get bored.  He is aware of this and is monitoring it to prevent slipping into depression.  He denied safety concerns.  He reported organizing his room and closet and this felt productive.  He reported wanting another task to take up a few days.  He reported socializing by playing video game with friends.  He reported his gym is closed but he is continuing to work out at home.  He reported poor and inconsistent sleep schedule.     Therapeutic Interventions/Treatment Strategies:  Psychotherapist offered support, feedback and validation and reinforced use of skills. Treatment modalities used include Motivational Interviewing, Cognitive Behavioral Therapy and Dialectical Behavioral Therapy.  Validated and normalized.  Highlighted and reinforced skills used; connected to positive outcomes.  Problem solved sleep schedule.  Provided additional skill suggestions.  Aided in creating a plan to help work towards goals.        Assessment:    Patient response:   Patient responded to session by accepting feedback, listening, focusing on goals, being attentive and accepting support    Possible barriers to participation / learning include: and no barriers identified    Health Issues:   None reported       Substance Use Review:   Substance Use: No active concerns identified.    Mental Status/Behavioral Observations  Appearance:   Phone check in   Eye Contact:   Phone check in   Psychomotor Behavior: Phone check in   Attitude:   Cooperative   Orientation:   All  Speech   Rate / Production: Normal    Volume:  Normal   Mood:    Normal  Affect:    Appropriate   Thought Content:   Clear  Thought Form:  Coherent  Logical     Insight:    Good     Plan:     Safety Plan: No current safety concerns identified.  Recommended that patient call 911 or go to the local ED should there be a change in any of these risk factors.     Barriers to treatment: None identified    Patient Contracts (see media tab):  None    Substance Use:  Not addressed in session     Continue or Discharge: Patient will continue in Adult Day Treatment (ADT)  as planned. Patient is likely to benefit from learning and using skills as they work toward the goals identified in their treatment plan.      Sagrario Moncada  March 30, 2020

## 2020-04-01 ENCOUNTER — HOSPITAL ENCOUNTER (OUTPATIENT)
Dept: BEHAVIORAL HEALTH | Facility: CLINIC | Age: 20
End: 2020-04-01
Attending: PSYCHIATRY & NEUROLOGY
Payer: COMMERCIAL

## 2020-04-01 NOTE — PROGRESS NOTES
Adult Mental Health Day Treatment  TRACK: 1A    PATIENT'S NAME: Fantasma Desouza  MRN:   [id number]  :   2000  ACCT. NUMBER: [id number]  DATE OF SERVICE: 20  START TIME: 1:00 pm  END TIME: 1:10     Telemedicine Visit: The patient's condition can be safely assessed and treated via synchronous audio and visual telemedicine encounter.      Reason for Telemedicine Visit: Covid 19    Originating Site (Patient Location): Patient's home    Distant Site (Provider Location): [facility]: [facility]    Consent:  The patient/guardian has verbally consented to: the potential risks and benefits of telemedicine (video visit) versus in person care; bill my insurance or make self-payment for services provided; and responsibility for payment of non-covered services.     Mode of Communication:  Video Conference via [employer]    As the provider I attest to compliance with applicable laws and regulations related to telemedicine.    NUMBER OF PARTICIPANTS: 1    Diagnoses:  296.32 (F33.1) Major Depressive Disorder, Recurrent Episode, Moderate _300.00 (F41.9) Unspecified Anxiety Disorder       Data:    Session content: At the start of this group, patients were invited to check in by identifying themselves, describing their current emotional status, and identifying issues to address in this group.   Area(s) of treatment focus addressed in this session included Symptom Management, Personal Safety, Develop / Improve Independent Living Skills and Develop Socialization / Interpersonal Relationship Skills.  Fantasma reported symptoms are low.  He reported sadness as his grandmother  yesterday but noted she had been ill for a long time and he felt relief in her passing.  He feels sad but at a place of acceptance.  He is not ruminating or engaging in symptoms.  He reported some worry about how his mom is coping with this change.  He reported maintaining the positive skills he has been using and set goals to  continue.  He stated desire to stay in group until he can discharge with his peers.      Therapeutic Interventions/Treatment Strategies:  Psychotherapist offered support, feedback and validation and reinforced use of skills. Treatment modalities used include Motivational Interviewing, Cognitive Behavioral Therapy and Dialectical Behavioral Therapy.  Validated and normalized.  Highlighted and reinforced skills used; connected to positive outcomes.  Provided additional skill suggestions.  Aided in creating a plan to help work towards goals.      Assessment:    Patient response:   Patient responded to session by accepting feedback, listening, focusing on goals, being attentive and accepting support    Possible barriers to participation / learning include: and no barriers identified    Health Issues:   None reported       Substance Use Review:   Substance Use: No active concerns identified.    Mental Status/Behavioral Observations  Appearance:   Appropriate   Eye Contact:   Good   Psychomotor Behavior: Normal   Attitude:   Cooperative   Orientation:   All  Speech   Rate / Production: Normal    Volume:  Normal   Mood:    Normal Sad   Affect:    Appropriate   Thought Content:   Clear  Thought Form:  Coherent  Logical     Insight:    Good     Plan:     Safety Plan: No current safety concerns identified.  Recommended that patient call 911 or go to the local ED should there be a change in any of these risk factors.     Barriers to treatment: None identified    Patient Contracts (see media tab):  None    Substance Use: Not addressed in session     Continue or Discharge: Patient will continue in Adult Day Treatment (ADT)  as planned. Patient is likely to benefit from learning and using skills as they work toward the goals identified in their treatment plan.      Sagrario Moncada  April 1, 2020

## 2020-04-02 ENCOUNTER — TELEPHONE (OUTPATIENT)
Dept: BEHAVIORAL HEALTH | Facility: CLINIC | Age: 20
End: 2020-04-02

## 2020-04-02 NOTE — TELEPHONE ENCOUNTER
Writer called and s/w pt explaining that group telehealth is beginning tomorrow, April 3rd, and to expect a notification around 9am to be invited to the group. Denied any questions.    Sandra Hammond RN on 4/2/2020 at 1:59 PM

## 2020-04-03 ENCOUNTER — TELEPHONE (OUTPATIENT)
Dept: BEHAVIORAL HEALTH | Facility: CLINIC | Age: 20
End: 2020-04-03

## 2020-04-03 NOTE — TELEPHONE ENCOUNTER
Spoke to Client.  He reported getting message about groups starting but did not provide a reason as to why he did not join the group.  He agreed to join group on Monday.  He denied safety concerns or need to check in.

## 2020-04-06 ENCOUNTER — HOSPITAL ENCOUNTER (OUTPATIENT)
Dept: BEHAVIORAL HEALTH | Facility: CLINIC | Age: 20
End: 2020-04-06
Attending: PSYCHIATRY & NEUROLOGY
Payer: COMMERCIAL

## 2020-04-06 PROCEDURE — 90853 GROUP PSYCHOTHERAPY: CPT | Mod: 95

## 2020-04-06 NOTE — GROUP NOTE
Process Group Note    PATIENT'S NAME: Fantasma Desouza  MRN:   [id number]  :   2000  ACCT. NUMBER: [id number]  DATE OF SERVICE: 20  START TIME:  9:00 AM  END TIME:  9:50 AM  FACILITATOR: Sagrario Moncada  TOPIC:  Process Group    Telemedicine Visit: The patient's condition can be safely assessed and treated via synchronous audio and visual telemedicine encounter.      Reason for Telemedicine Visit: covid 19    Originating Site (Patient Location): Patient's home    Distant Site (Provider Location): [facility]: [facility]    Consent:  The patient/guardian has verbally consented to: the potential risks and benefits of telemedicine (video visit) versus in person care; bill my insurance or make self-payment for services provided; and responsibility for payment of non-covered services.     Mode of Communication:  Video Conference via [employer]    As the provider I attest to compliance with applicable laws and regulations related to telemedicine.    Diagnoses:  296.32 (F33.1) Major Depressive Disorder, Recurrent Episode, Moderate _300.00 (F41.9) Unspecified Anxiety Disorder       Adult Mental Health Day Treatment  TRACK: 1A    NUMBER OF PARTICIPANTS: 5          Data:    Session content: At the start of this group, patients were invited to check in by identifying themselves, describing their current emotional status, and identifying issues to address in this group.   Area(s) of treatment focus addressed in this session included Symptom Management, Personal Safety, Develop / Improve Independent Living Skills and Develop Socialization / Interpersonal Relationship Skills.  Fantasma reported ongoing positive mood.  He gave himself credit for attending to self care and working out.  He reported feeling bored but is not letting this become depression.  He reported lack of stressors.  He reported plans to return to work tomorrow and is looking forward to it despite past experiences of being triggered  at work.      Therapeutic Interventions/Treatment Strategies:  Psychotherapist offered support, feedback and validation and reinforced use of skills. Treatment modalities used include Motivational Interviewing, Cognitive Behavioral Therapy and Dialectical Behavioral Therapy.  Validated and normalized.  Highlighted and reinforced skills used; connected to positive outcomes.  Provided additional skill suggestions.  Aided in creating a plan to help work towards goals.      Assessment:    Patient response:   Patient responded to session by accepting feedback, giving feedback, listening, focusing on goals, being attentive and accepting support    Possible barriers to participation / learning include: and no barriers identified    Health Issues:   None reported       Substance Use Review:   Substance Use: No active concerns identified.    Mental Status/Behavioral Observations  Appearance:   Appropriate   Eye Contact:   Good   Psychomotor Behavior: Normal   Attitude:   Cooperative   Orientation:   All  Speech   Rate / Production: Normal    Volume:  Normal   Mood:    Normal  Affect:    Appropriate   Thought Content:   Clear  Thought Form:  Coherent  Logical     Insight:    Good     Plan:     Safety Plan: No current safety concerns identified.  Recommended that patient call 911 or go to the local ED should there be a change in any of these risk factors.     Barriers to treatment: None identified    Patient Contracts (see media tab):  None    Substance Use: Not addressed in session     Continue or Discharge: Patient will continue in Adult Day Treatment (ADT)  as planned. Patient is likely to benefit from learning and using skills as they work toward the goals identified in their treatment plan.      Sagrario Moncada  April 6, 2020

## 2020-04-06 NOTE — ADDENDUM NOTE
Encounter addended by: Sagrario Moncada on: 4/6/2020 1:56 PM   Actions taken: Flowsheet accepted Yes

## 2020-04-08 ENCOUNTER — TELEPHONE (OUTPATIENT)
Dept: BEHAVIORAL HEALTH | Facility: CLINIC | Age: 20
End: 2020-04-08

## 2020-04-08 NOTE — TELEPHONE ENCOUNTER
Spoke with Client.  He reported return to work and inability to attend group sright now. He wants to stay on the roster to wait to discharge in person.  Stated this was not an option as insurance would want him in groups regularly during this time if he were to jump back in in a few weeks.  He stated understanding and agreed to discharge on Friday.

## 2020-04-10 ENCOUNTER — TELEPHONE (OUTPATIENT)
Dept: BEHAVIORAL HEALTH | Facility: CLINIC | Age: 20
End: 2020-04-10

## 2020-04-10 NOTE — TELEPHONE ENCOUNTER
Spoke with Client.  He reported being unable to get away from work to attend group.  He agreed to try one more time on Monday to complete discharge with peers.

## 2020-04-13 ENCOUNTER — TELEPHONE (OUTPATIENT)
Dept: BEHAVIORAL HEALTH | Facility: CLINIC | Age: 20
End: 2020-04-13

## 2020-04-14 NOTE — TELEPHONE ENCOUNTER
Spoke with Client.  Updated discharge paperwork, PHQ9, and GAD7.  He gave self credit for what he worked on while he was in programming.  Writer reinforced and gave Client' additional credit on progress in symptoms.  Client asked writer to share his cellphone to previous group members so they can stay in contact.

## 2023-05-28 NOTE — GROUP NOTE
"Process Group Note    PATIENT'S NAME: Fantasma Desouza  MRN:   [id number]  :   2000  ACCT. NUMBER: [id number]  DATE OF SERVICE: 20  START TIME:  9:00 AM  END TIME:  9:50 AM  FACILITATOR: Ashleigh Ortiz LPCC; Sagrario Moncada  TOPIC:  Process Group    Diagnoses:  296.32 (F33.1) Major Depressive Disorder, Recurrent Episode, Moderate _300.00 (F41.9) Unspecified Anxiety Disorder       Adult Mental Health Day Treatment  TRACK: 1A    NUMBER OF PARTICIPANTS: 7          Data:    Session content: At the start of this group, patients were invited to check in by identifying themselves, describing their current emotional status, and identifying issues to address in this group.   Area(s) of treatment focus addressed in this session included Symptom Management.  Patient reported his past two days have been \"not too terrible\" and reported he went to work and a doctor appointment to follow-up regarding his medications. Patient processed emotion she experienced while at the doctor when he noticed his BMI in his chart; patient identified that he was \"hard\" on himself and thought about his body measurements. Patient identified that he utilized checking the facts as a skill. Patient goal planned to engage in healthy eating habits and to plan ahead to distract himself on Friday as Julianna's Day will be hard for him this year.     Therapeutic Interventions/Treatment Strategies:  Psychotherapist offered support, feedback and validation and reinforced use of skills. Treatment modalities used include Motivational Interviewing and Cognitive Behavioral Therapy. Interventions include Symptoms Management: Promoted understanding of their diagnoses and how it impacts their functioning, Emotions Management:  Discussed barriers to emotional regulation and Relationship Skills: Assisted patients in implementing more effective communication skills in their relationships.    Assessment:    Patient response:   Patient responded to " session by accepting feedback, giving feedback, listening and focusing on goals    Possible barriers to participation / learning include: and no barriers identified    Health Issues:   None reported       Substance Use Review:   Substance Use: No active concerns identified.    Mental Status/Behavioral Observations  Appearance:   Appropriate   Eye Contact:   Fair   Psychomotor Behavior: Normal   Attitude:   Cooperative   Orientation:   All  Speech   Rate / Production: Normal    Volume:  Normal   Mood:    Anxious  Depressed  Normal  Affect:    Appropriate   Thought Content:   Clear and Safety reports  presence of suicidal ideation passive suicidal ideation  in regards to thinking ahead for Friday (Levine's Day) and being single this year for the first time in 4 years  Thought Form:  Coherent  Logical     Insight:    Fair     Plan:     Safety Plan: Patient consented to co-developed safety plan.  Safety and risk management plan was completed.  Patient agreed to use safety plan should any safety concerns arise.  A copy was given to the patient.     Barriers to treatment: None identified    Patient Contracts (see media tab):  None    Substance Use: Not addressed in session     Continue or Discharge: Patient will continue in Adult Day Treatment (ADT)  as planned. Patient is likely to benefit from learning and using skills as they work toward the goals identified in their treatment plan.      Ashleigh Ortiz, [facility]  February 12, 2020   No